# Patient Record
Sex: FEMALE | Race: WHITE | NOT HISPANIC OR LATINO | Employment: UNEMPLOYED | URBAN - METROPOLITAN AREA
[De-identification: names, ages, dates, MRNs, and addresses within clinical notes are randomized per-mention and may not be internally consistent; named-entity substitution may affect disease eponyms.]

---

## 2018-09-06 ENCOUNTER — HOSPITAL ENCOUNTER (EMERGENCY)
Facility: HOSPITAL | Age: 38
Discharge: HOME | End: 2018-09-06
Attending: SURGERY

## 2018-09-06 VITALS
OXYGEN SATURATION: 100 % | HEART RATE: 89 BPM | TEMPERATURE: 97 F | RESPIRATION RATE: 18 BRPM | DIASTOLIC BLOOD PRESSURE: 69 MMHG | WEIGHT: 134.81 LBS | SYSTOLIC BLOOD PRESSURE: 113 MMHG

## 2018-09-06 DIAGNOSIS — M54.50 LOW BACK PAIN: ICD-10-CM

## 2018-09-06 DIAGNOSIS — L02.01 ABSCESS OF FACE: Primary | ICD-10-CM

## 2018-09-06 LAB
BILIRUB UR QL STRIP: NEGATIVE
CLARITY UR: CLEAR
COLOR UR: YELLOW
GLUCOSE UR QL STRIP: NEGATIVE
HGB UR QL STRIP: ABNORMAL
KETONES UR QL STRIP: NEGATIVE
LEUKOCYTE ESTERASE UR QL STRIP: NEGATIVE
NITRITE UR QL STRIP: NEGATIVE
PH UR STRIP: 6 [PH] (ref 5–8)
PROT UR QL STRIP: NEGATIVE
SP GR UR STRIP: >=1.03 (ref 1–1.03)
URN SPEC COLLECT METH UR: ABNORMAL
UROBILINOGEN UR STRIP-ACNC: NEGATIVE EU/DL

## 2018-09-06 PROCEDURE — 96372 THER/PROPH/DIAG INJ SC/IM: CPT

## 2018-09-06 PROCEDURE — 81003 URINALYSIS AUTO W/O SCOPE: CPT

## 2018-09-06 PROCEDURE — 63600175 PHARM REV CODE 636 W HCPCS: Performed by: NURSE PRACTITIONER

## 2018-09-06 PROCEDURE — 99284 EMERGENCY DEPT VISIT MOD MDM: CPT | Mod: 25

## 2018-09-06 PROCEDURE — 25000003 PHARM REV CODE 250: Performed by: NURSE PRACTITIONER

## 2018-09-06 RX ORDER — CYCLOBENZAPRINE HCL 10 MG
10 TABLET ORAL EVERY 8 HOURS PRN
Qty: 12 TABLET | Refills: 0 | OUTPATIENT
Start: 2018-09-06 | End: 2020-06-20

## 2018-09-06 RX ORDER — TRAMADOL HYDROCHLORIDE 50 MG/1
50 TABLET ORAL EVERY 6 HOURS PRN
Qty: 12 TABLET | Refills: 0 | Status: SHIPPED | OUTPATIENT
Start: 2018-09-06 | End: 2018-09-11

## 2018-09-06 RX ORDER — MUPIROCIN 20 MG/G
OINTMENT TOPICAL ONCE
Status: COMPLETED | OUTPATIENT
Start: 2018-09-06 | End: 2018-09-06

## 2018-09-06 RX ORDER — SULFAMETHOXAZOLE AND TRIMETHOPRIM 800; 160 MG/1; MG/1
1 TABLET ORAL
Status: COMPLETED | OUTPATIENT
Start: 2018-09-06 | End: 2018-09-06

## 2018-09-06 RX ORDER — SULFAMETHOXAZOLE AND TRIMETHOPRIM 800; 160 MG/1; MG/1
1 TABLET ORAL EVERY 12 HOURS
Qty: 14 TABLET | Refills: 0 | Status: SHIPPED | OUTPATIENT
Start: 2018-09-06 | End: 2018-09-13

## 2018-09-06 RX ORDER — MUPIROCIN 20 MG/G
OINTMENT TOPICAL 3 TIMES DAILY
Qty: 15 G | Refills: 0 | OUTPATIENT
Start: 2018-09-06 | End: 2020-06-20

## 2018-09-06 RX ORDER — ORPHENADRINE CITRATE 30 MG/ML
60 INJECTION INTRAMUSCULAR; INTRAVENOUS
Status: COMPLETED | OUTPATIENT
Start: 2018-09-06 | End: 2018-09-06

## 2018-09-06 RX ORDER — KETOROLAC TROMETHAMINE 30 MG/ML
60 INJECTION, SOLUTION INTRAMUSCULAR; INTRAVENOUS
Status: COMPLETED | OUTPATIENT
Start: 2018-09-06 | End: 2018-09-06

## 2018-09-06 RX ADMIN — ORPHENADRINE CITRATE 60 MG: 30 INJECTION INTRAMUSCULAR; INTRAVENOUS at 01:09

## 2018-09-06 RX ADMIN — KETOROLAC TROMETHAMINE 60 MG: 30 INJECTION, SOLUTION INTRAMUSCULAR at 01:09

## 2018-09-06 RX ADMIN — MUPIROCIN: 20 OINTMENT TOPICAL at 01:09

## 2018-09-06 RX ADMIN — SULFAMETHOXAZOLE AND TRIMETHOPRIM 1 TABLET: 800; 160 TABLET ORAL at 01:09

## 2018-09-06 NOTE — ED PROVIDER NOTES
Encounter Date: 9/6/2018       History     Chief Complaint   Patient presents with    Back Pain     The history is provided by the patient.   Back Pain    The current episode started two days ago. The problem occurs constantly. The pain is associated with no known injury. The pain is present in the lumbar spine (left sided). The quality of the pain is described as shooting and stabbing. The pain does not radiate. The pain is at a severity of 8/10. The symptoms are aggravated by bending, twisting and certain positions. Pertinent negatives include no chest pain, no fever, no headaches, no abdominal pain, no bowel incontinence, no bladder incontinence, no dysuria and no weakness. She has tried nothing for the symptoms.   Abscess    This is a new problem. The current episode started two days ago. The problem has been gradually worsening. Affected Location: chin. The pain is at a severity of 8/10. The abscess is characterized by redness, painfulness, draining and swelling. Pertinent negatives include no fever, no diarrhea, no vomiting, no congestion, no rhinorrhea, no sore throat and no cough.     Review of patient's allergies indicates:  No Known Allergies  History reviewed. No pertinent past medical history.  Past Surgical History:   Procedure Laterality Date    TUBAL LIGATION       History reviewed. No pertinent family history.  Social History     Tobacco Use    Smoking status: Not on file   Substance Use Topics    Alcohol use: Not on file    Drug use: Not on file     Review of Systems   Constitutional: Negative for chills, fatigue and fever.   HENT: Negative for congestion, dental problem, ear pain, rhinorrhea, sore throat and trouble swallowing.    Eyes: Negative for pain, discharge, redness and visual disturbance.   Respiratory: Negative for cough, shortness of breath and wheezing.    Cardiovascular: Negative for chest pain, palpitations and leg swelling.   Gastrointestinal: Negative for abdominal pain, bowel  incontinence, constipation, diarrhea, nausea and vomiting.   Genitourinary: Negative for bladder incontinence, difficulty urinating, dysuria, flank pain, frequency, hematuria and urgency.   Musculoskeletal: Positive for back pain. Negative for arthralgias, myalgias and neck pain.   Skin: Positive for wound. Negative for pallor and rash.   Neurological: Negative for seizures, weakness and headaches.   Psychiatric/Behavioral: Negative.        Physical Exam     Initial Vitals [09/06/18 1334]   BP Pulse Resp Temp SpO2   132/75 87 20 97.3 °F (36.3 °C) 100 %      MAP       --         Physical Exam    Nursing note and vitals reviewed.  Constitutional: No distress.   HENT:   Head: Normocephalic and atraumatic.   Right Ear: External ear normal.   Left Ear: External ear normal.   Nose: Nose normal.   Mouth/Throat: Oropharynx is clear and moist.   Eyes: Conjunctivae, EOM and lids are normal. Pupils are equal, round, and reactive to light.   Neck: Neck supple.   Cardiovascular: Normal rate, regular rhythm, S1 normal, S2 normal, normal heart sounds and intact distal pulses.   Pulmonary/Chest: Effort normal and breath sounds normal. No respiratory distress.   Abdominal: Soft. Bowel sounds are normal. There is no tenderness. There is no CVA tenderness.   Musculoskeletal: Normal range of motion.        Lumbar back: She exhibits tenderness ( left paraspinal). She exhibits normal range of motion ( patient remains with full range of motion, but reports pain with certain positions /movement), no swelling, no edema, no deformity, no laceration and normal pulse.   Neurological: She is alert and oriented to person, place, and time. She has normal strength. GCS eye subscore is 4. GCS verbal subscore is 5. GCS motor subscore is 6.   Skin: Skin is warm and dry. Capillary refill takes less than 2 seconds. No rash noted. There is erythema ( withInduration, tenderness with palpation and mild drainage).   Psychiatric: She has a normal mood and  affect. Her speech is normal and behavior is normal.         ED Course   Procedures  Labs Reviewed   URINALYSIS, REFLEX TO URINE CULTURE - Abnormal; Notable for the following components:       Result Value    Specific Gravity, UA >=1.030 (*)     Occult Blood UA Trace (*)     All other components within normal limits    Narrative:     Preferred Collection Type->Urine, Clean Catch          Imaging Results          CT Renal Stone Study ABD Pelvis WO (Final result)  Result time 09/06/18 14:43:53    Final result by Sunita Carlson MD (09/06/18 14:43:53)                 Impression:      No nephrolithiasis, ureterolithiasis, hydronephrosis or hydroureter.      Electronically signed by: Sunita Carlson MD  Date:    09/06/2018  Time:    14:43             Narrative:    EXAMINATION:  CT RENAL STONE STUDY ABD PELVIS WO    CLINICAL HISTORY:  Hematuria;left side low back pain;    TECHNIQUE:  Low dose axial images, sagittal and coronal reformations were obtained from the lung bases to the pubic symphysis.  Contrast was not administered.    COMPARISON:  None    FINDINGS:  The lung bases are clear.  Base heart appears normal.  Calcified atheromatous disease affects the aorta and its branch vessels.    No radiopaque gallstones are seen.  No intrahepatic or extrahepatic biliary ductal dilatation is identified.  The unenhanced liver, spleen, pancreas and adrenal glands appear normal.  Both kidneys are normal in size, shape and contour.  No nephrolithiasis, ureterolithiasis, hydronephrosis or hydroureter is seen.  The urinary bladder is not well distended and cannot be adequately evaluated.  The uterus and adnexa appear normal.    Constipation.  No free air, free fluid or obstruction is detected.  No pathologically enlarged abdominal or pelvic lymph nodes are seen.    The skeletal structures show age-appropriate degenerative change.                               X-Ray Lumbar Spine Ap And Lateral (Final result)  Result time 09/06/18  14:05:38    Final result by Sunita Carlson MD (09/06/18 14:05:38)                 Impression:      As above.      Electronically signed by: Sunita Carlson MD  Date:    09/06/2018  Time:    14:05             Narrative:    EXAMINATION:  XR LUMBAR SPINE AP AND LATERAL    CLINICAL HISTORY:  low back pain;Low back pain    TECHNIQUE:  AP, lateral and spot images were performed of the lumbar spine.    COMPARISON:  None    FINDINGS:  Vertebral body alignment and heights are maintained.  There is disc space narrowing with endplate sclerosis at the L5-S1 level.  No fracture or subluxation is seen.                                     Medications   ketorolac injection 60 mg (60 mg Intramuscular Given 9/6/18 1348)   orphenadrine injection 60 mg (60 mg Intramuscular Given 9/6/18 1348)   sulfamethoxazole-trimethoprim 800-160mg per tablet 1 tablet (1 tablet Oral Given 9/6/18 1349)   mupirocin 2 % ointment ( Topical (Top) Given 9/6/18 1348)           1430  Patient update:  Patient reports improvement  In pain to her chin.  Reports that her back pain is still present.              Clinical Impression:   The primary encounter diagnosis was Abscess of face. A diagnosis of Low back pain was also pertinent to this visit.      Educated patient to take all antibiotics and apply warm compresses to chin to help with drainage.    Disposition:   Disposition: Discharged  Condition: Stable    The patient acknowledges that close follow up with medical provider is required. Instructed to follow up with PCP within 2 days. Patient was given specific return precautions. The patient agrees to comply with all instruction and directions given in the ER.        New Prescriptions    CYCLOBENZAPRINE (FLEXERIL) 10 MG TABLET    Take 1 tablet (10 mg total) by mouth every 8 (eight) hours as needed for Muscle spasms.    MUPIROCIN (BACTROBAN) 2 % OINTMENT    Apply topically 3 (three) times daily.    SULFAMETHOXAZOLE-TRIMETHOPRIM 800-160MG (BACTRIM DS) 800-160  MG TAB    Take 1 tablet by mouth every 12 (twelve) hours. for 7 days    TRAMADOL (ULTRAM) 50 MG TABLET    Take 1 tablet (50 mg total) by mouth every 6 (six) hours as needed for Pain.        was reviewed.                   Rhonda Ortiz NP  09/06/18 [id number]

## 2018-09-06 NOTE — ED TRIAGE NOTES
38 y.o. female presents to ER   Chief Complaint   Patient presents with    Back Pain   Pt reports lower back pain for two days and abscess to chin. No acute distress noted.

## 2018-09-06 NOTE — DISCHARGE INSTRUCTIONS
Wash area twice a day with antibacterial soap and water. Apply antibiotic ointment three times a day. Keep area clean. Take all antibiotics. Monitor for signs of infection such as redness, swelling, purulent discharge, or fever.     No not drive, drink alcohol, or operate machinery while taking this prescribed medication.    **Follow up with PCP in 24-48 hours. Return to ER with worsening of symptoms.     **Drink plenty fluids. Get plenty rest. Wash hands frequently.     **Our goal in the emergency department is to always give you outstanding care and exceptional service. You may receive a survey by mail or e-mail in the next week regarding your experience in our ED. We would greatly appreciate your completing and returning the survey. Your feedback provides us with a way to recognize our staff who give very good care and it helps us learn how to improve when your experience was below our aspiration of excellence.

## 2019-03-15 ENCOUNTER — HOSPITAL ENCOUNTER (EMERGENCY)
Age: 39
Discharge: HOME | End: 2019-03-15
Attending: EMERGENCY MEDICINE
Payer: COMMERCIAL

## 2019-03-15 VITALS
HEART RATE: 80 BPM | WEIGHT: 293 LBS | DIASTOLIC BLOOD PRESSURE: 86 MMHG | OXYGEN SATURATION: 99 % | BODY MASS INDEX: 51.91 KG/M2 | TEMPERATURE: 98.3 F | SYSTOLIC BLOOD PRESSURE: 159 MMHG | RESPIRATION RATE: 18 BRPM | HEIGHT: 63 IN

## 2019-03-15 DIAGNOSIS — R53.83 FATIGUE, UNSPECIFIED TYPE: Primary | ICD-10-CM

## 2019-03-15 DIAGNOSIS — D64.9 CHRONIC ANEMIA: ICD-10-CM

## 2019-03-15 DIAGNOSIS — L30.9 ECZEMA, UNSPECIFIED TYPE: ICD-10-CM

## 2019-03-15 LAB
ANION GAP SERPL CALC-SCNC: 9 MMOL/L (ref 3–18)
BASOPHILS # BLD: 0 K/UL (ref 0–0.1)
BASOPHILS NFR BLD: 1 % (ref 0–2)
BUN SERPL-MCNC: 8 MG/DL (ref 7–18)
BUN/CREAT SERPL: 13 (ref 12–20)
CALCIUM SERPL-MCNC: 8.4 MG/DL (ref 8.5–10.1)
CHLORIDE SERPL-SCNC: 102 MMOL/L (ref 100–108)
CO2 SERPL-SCNC: 27 MMOL/L (ref 21–32)
CREAT SERPL-MCNC: 0.62 MG/DL (ref 0.6–1.3)
DIFFERENTIAL METHOD BLD: ABNORMAL
EOSINOPHIL # BLD: 0.3 K/UL (ref 0–0.4)
EOSINOPHIL NFR BLD: 5 % (ref 0–5)
ERYTHROCYTE [DISTWIDTH] IN BLOOD BY AUTOMATED COUNT: 14.2 % (ref 11.6–14.5)
GLUCOSE SERPL-MCNC: 122 MG/DL (ref 74–99)
HCG SERPL QL: NEGATIVE
HCT VFR BLD AUTO: 32.1 % (ref 35–45)
HGB BLD-MCNC: 9.5 G/DL (ref 12–16)
LYMPHOCYTES # BLD: 2.2 K/UL (ref 0.9–3.6)
LYMPHOCYTES NFR BLD: 37 % (ref 21–52)
MCH RBC QN AUTO: 27.8 PG (ref 24–34)
MCHC RBC AUTO-ENTMCNC: 29.6 G/DL (ref 31–37)
MCV RBC AUTO: 93.9 FL (ref 74–97)
MONOCYTES # BLD: 0.3 K/UL (ref 0.05–1.2)
MONOCYTES NFR BLD: 5 % (ref 3–10)
NEUTS SEG # BLD: 3.1 K/UL (ref 1.8–8)
NEUTS SEG NFR BLD: 52 % (ref 40–73)
PLATELET # BLD AUTO: 400 K/UL (ref 135–420)
PMV BLD AUTO: 10.5 FL (ref 9.2–11.8)
POTASSIUM SERPL-SCNC: 4.4 MMOL/L (ref 3.5–5.5)
RBC # BLD AUTO: 3.42 M/UL (ref 4.2–5.3)
SODIUM SERPL-SCNC: 138 MMOL/L (ref 136–145)
T4 FREE SERPL-MCNC: 0.9 NG/DL (ref 0.7–1.5)
TSH SERPL DL<=0.05 MIU/L-ACNC: 4.05 UIU/ML (ref 0.36–3.74)
WBC # BLD AUTO: 5.9 K/UL (ref 4.6–13.2)

## 2019-03-15 PROCEDURE — 80048 BASIC METABOLIC PNL TOTAL CA: CPT

## 2019-03-15 PROCEDURE — 99283 EMERGENCY DEPT VISIT LOW MDM: CPT

## 2019-03-15 PROCEDURE — 84703 CHORIONIC GONADOTROPIN ASSAY: CPT

## 2019-03-15 PROCEDURE — 84439 ASSAY OF FREE THYROXINE: CPT

## 2019-03-15 PROCEDURE — 85025 COMPLETE CBC W/AUTO DIFF WBC: CPT

## 2019-03-15 PROCEDURE — 84443 ASSAY THYROID STIM HORMONE: CPT

## 2019-03-15 RX ORDER — ASCORBIC ACID 250 MG
250 TABLET ORAL 2 TIMES DAILY
Qty: 120 TAB | Refills: 0 | Status: SHIPPED | OUTPATIENT
Start: 2019-03-15 | End: 2019-05-14

## 2019-03-15 RX ORDER — TRIAMCINOLONE ACETONIDE 1 MG/G
OINTMENT TOPICAL 2 TIMES DAILY
Qty: 30 G | Refills: 0 | Status: SHIPPED | OUTPATIENT
Start: 2019-03-15 | End: 2021-12-06

## 2019-03-15 RX ORDER — FERROUS FUMARATE 324(106)MG
324 TABLET ORAL 2 TIMES DAILY
Qty: 60 TAB | Refills: 0 | Status: SHIPPED | OUTPATIENT
Start: 2019-03-15 | End: 2019-04-14

## 2019-03-15 NOTE — ED PROVIDER NOTES
Surekha Hussein is a 43 y.o. Female coming in with fatigue. Patient states that she has been fatigued for a couple weeks, but it has been worse over the last week. Denies cough, cold, congestion, fevers, dysuria. Denies sexual activity or vaginal discharge. Last menstrual period ended a week ago and lasted about 7 days which is normal. Denies dizziness, lightheadedness, CP or SOB. Also reports chronic exacerabtion of her eczema. No fevers, chills or weeping. No other complaints at this time. Past Medical History:   Diagnosis Date    Back pain     Chronic lumbosacral spine strain/sprain    Bilateral knee pain     Hypertension     Mid sternal chest pain     Pain of costosternal area    Morbid obesity ([facility] [address]        History reviewed. No pertinent surgical history. History reviewed. No pertinent family history. Social History     Socioeconomic History    Marital status: SINGLE     Spouse name: Not on file    Number of children: Not on file    Years of education: Not on file    Highest education level: Not on file   Social Needs    Financial resource strain: Not on file    Food insecurity - worry: Not on file    Food insecurity - inability: Not on file    Transportation needs - medical: Not on file   [employer] needs - non-medical: Not on file   Occupational History    Not on file   Tobacco Use    Smoking status: Never Smoker   Substance and Sexual Activity    Alcohol use: No    Drug use: No    Sexual activity: Not on file   Other Topics Concern    Not on file   Social History Narrative    Not on file         ALLERGIES: Penicillins and Sulfate salt    Review of Systems   Constitutional: Positive for fatigue. Negative for chills and fever. HENT: Negative. Negative for congestion. Eyes: Negative. Negative for visual disturbance. Respiratory: Negative. Negative for cough and shortness of breath. Cardiovascular: Negative. Negative for chest pain and leg swelling. Gastrointestinal: Negative. Negative for abdominal pain and vomiting. Genitourinary: Negative. Negative for difficulty urinating, dysuria and vaginal discharge. Musculoskeletal: Negative. Negative for back pain and myalgias. Skin: Positive for rash. Negative for wound. Neurological: Negative. Negative for dizziness, weakness and light-headedness. Psychiatric/Behavioral: Negative. Negative for suicidal ideas. All other systems reviewed and are negative. Vitals:    03/15/19 1030 03/15/19 1157   BP: 159/86    Pulse: 80    Resp: 18    Temp: 98.3 °F (36.8 °C)    SpO2: 99% 99%   Weight: 152.9 kg (337 lb)    Height: 5' 3\" (1.6 m)             Physical Exam   Constitutional: She is oriented to person, place, and time. She appears well-developed and well-nourished. No distress. Morbidly obsese. HENT:   Head: Normocephalic and atraumatic. Mouth/Throat: Oropharynx is clear and moist.   Eyes: Conjunctivae and EOM are normal. Pupils are equal, round, and reactive to light. No scleral icterus. Neck: Trachea normal and normal range of motion. Neck supple. No JVD present. No thyromegaly present. Cardiovascular: Normal rate, regular rhythm, S1 normal and S2 normal. Exam reveals no gallop and no friction rub. No murmur heard. Pulmonary/Chest: Effort normal and breath sounds normal. No accessory muscle usage. No respiratory distress. Abdominal: Soft. Normal appearance. She exhibits no distension. There is no tenderness. There is no rigidity, no rebound and no guarding. Musculoskeletal: Normal range of motion. She exhibits no edema or tenderness. Neurological: She is alert and oriented to person, place, and time. She has normal strength. No cranial nerve deficit or sensory deficit. Coordination normal.   Skin: Skin is warm and intact. No rash noted. Chronic area of patchy erythema and flaking at flexor surfaces. Psychiatric: She has a normal mood and affect.  Her speech is normal and behavior is normal.   Vitals reviewed. MDM  Number of Diagnoses or Management Options  Chronic anemia:   Eczema, unspecified type: Fatigue, unspecified type:   Diagnosis management comments: Nina Romero is a 43 y.o. Female coming in with fatigue. Normal vitals, well appearing. No evidence of infectious process. Will get basic labs, hbg, and TSH. T4 WNL, hbg at baseline. Will start on iron and vit C and refer for outpatient PCP follow up. Also requesting topical steroid for chronic eczema, no evidence of bacterial infection. Procedures    Vitals:  Patient Vitals for the past 12 hrs:   Temp Pulse Resp BP SpO2   03/15/19 1157 -- -- -- -- 99 %   03/15/19 1030 98.3 °F (36.8 °C) 80 18 159/86 99 %       Medications ordered:   Medications - No data to display      Lab findings:  Recent Results (from the past 12 hour(s))   CBC WITH AUTOMATED DIFF    Collection Time: 03/15/19 11:20 AM   Result Value Ref Range    WBC 5.9 4.6 - 13.2 K/uL    RBC 3.42 (L) 4.20 - 5.30 M/uL    HGB 9.5 (L) 12.0 - 16.0 g/dL    HCT 32.1 (L) 35.0 - 45.0 %    MCV 93.9 74.0 - 97.0 FL    MCH 27.8 24.0 - 34.0 PG    MCHC 29.6 (L) 31.0 - 37.0 g/dL    RDW 14.2 11.6 - 14.5 %    PLATELET 881 541 - 363 K/uL    MPV 10.5 9.2 - 11.8 FL    NEUTROPHILS 52 40 - 73 %    LYMPHOCYTES 37 21 - 52 %    MONOCYTES 5 3 - 10 %    EOSINOPHILS 5 0 - 5 %    BASOPHILS 1 0 - 2 %    ABS. NEUTROPHILS 3.1 1.8 - 8.0 K/UL    ABS. LYMPHOCYTES 2.2 0.9 - 3.6 K/UL    ABS. MONOCYTES 0.3 0.05 - 1.2 K/UL    ABS. EOSINOPHILS 0.3 0.0 - 0.4 K/UL    ABS.  BASOPHILS 0.0 0.0 - 0.1 K/UL    DF AUTOMATED     METABOLIC PANEL, BASIC    Collection Time: 03/15/19 11:20 AM   Result Value Ref Range    Sodium 138 136 - 145 mmol/L    Potassium 4.4 3.5 - 5.5 mmol/L    Chloride 102 100 - 108 mmol/L    CO2 27 21 - 32 mmol/L    Anion gap 9 3.0 - 18 mmol/L    Glucose 122 (H) 74 - 99 mg/dL    BUN 8 7.0 - 18 MG/DL    Creatinine 0.62 0.6 - 1.3 MG/DL    BUN/Creatinine ratio 13 12 - 20      GFR est AA >60 >60 ml/min/1.73m2    GFR est non-AA >60 >60 ml/min/1.73m2    Calcium 8.4 (L) 8.5 - 10.1 MG/DL   TSH 3RD GENERATION    Collection Time: 03/15/19 11:20 AM   Result Value Ref Range    TSH 4.05 (H) 0.36 - 3.74 uIU/mL   HCG QL SERUM    Collection Time: 03/15/19 11:20 AM   Result Value Ref Range    HCG, Ql. NEGATIVE  NEG     T4, FREE    Collection Time: 03/15/19 11:20 AM   Result Value Ref Range    T4, Free 0.9 0.7 - 1.5 NG/DL       Reevaluation of patient:   I have reassessed the patient. I discussed all results with the patient as well as possible causes of her symptoms. She was counseled on the importance of follow-up and  consult was made. Verbalizes understanding and agrees with discharge and follow-up plan. Disposition:  Diagnosis:   1. Fatigue, unspecified type    2. Chronic anemia    3. Eczema, unspecified type        Disposition: Discharge    Follow-up Information     Follow up With Specialties Details Why [address] [place] [address]  Schedule an appointment as soon as possible for a visit for office follow up Uriel Thibodeaux [address]  [address]    [address] [facility] EMERGENCY DEPT Emergency Medicine  As needed, If symptoms worsen [address] [facility]  [phone]              Medication List      START taking these medications    ascorbic acid (vitamin C) 250 mg tablet  Commonly known as:  VITAMIN C  Take 1 Tab by mouth two (2) times a day for 60 days. ferrous fumarate 324 mg (106 mg iron) Tab  Take 324 mg by mouth two (2) times a day for 30 days. triamcinolone acetonide 0.1 % ointment  Commonly known as:  KENALOG  Apply  to affected area two (2) times a day.  use thin layer           Where to Get Your Medications      Information about where to get these medications is not yet available    Ask your nurse or doctor about these medications  · ascorbic acid (vitamin C) 250 mg tablet  · ferrous fumarate 324 mg (106 mg iron) Tab  · triamcinolone acetonide 0.1 % ointment

## 2020-06-20 ENCOUNTER — HOSPITAL ENCOUNTER (EMERGENCY)
Facility: HOSPITAL | Age: 40
Discharge: HOME | End: 2020-06-20
Attending: SURGERY

## 2020-06-20 VITALS
SYSTOLIC BLOOD PRESSURE: 106 MMHG | OXYGEN SATURATION: 100 % | RESPIRATION RATE: 18 BRPM | WEIGHT: 155.19 LBS | TEMPERATURE: 97 F | HEART RATE: 70 BPM | DIASTOLIC BLOOD PRESSURE: 58 MMHG

## 2020-06-20 DIAGNOSIS — F43.9 STRESS: Primary | ICD-10-CM

## 2020-06-20 DIAGNOSIS — M25.473 REDNESS AND SWELLING OF ANKLE: ICD-10-CM

## 2020-06-20 DIAGNOSIS — M25.571 ACUTE BILATERAL ANKLE PAIN: ICD-10-CM

## 2020-06-20 DIAGNOSIS — M25.572 ACUTE BILATERAL ANKLE PAIN: ICD-10-CM

## 2020-06-20 DIAGNOSIS — R23.8 REDNESS AND SWELLING OF ANKLE: ICD-10-CM

## 2020-06-20 LAB
ALBUMIN SERPL BCP-MCNC: 3.5 G/DL (ref 3.5–5.2)
ALP SERPL-CCNC: 63 U/L (ref 55–135)
ALT SERPL W/O P-5'-P-CCNC: 32 U/L (ref 10–44)
ANION GAP SERPL CALC-SCNC: 12 MMOL/L (ref 8–16)
AST SERPL-CCNC: 19 U/L (ref 10–40)
BASOPHILS # BLD AUTO: 0.03 K/UL (ref 0–0.2)
BASOPHILS NFR BLD: 0.3 % (ref 0–1.9)
BILIRUB SERPL-MCNC: 0.3 MG/DL (ref 0.1–1)
BUN SERPL-MCNC: 10 MG/DL (ref 6–20)
CALCIUM SERPL-MCNC: 9 MG/DL (ref 8.7–10.5)
CHLORIDE SERPL-SCNC: 105 MMOL/L (ref 95–110)
CO2 SERPL-SCNC: 24 MMOL/L (ref 23–29)
CREAT SERPL-MCNC: 0.7 MG/DL (ref 0.5–1.4)
CRP SERPL-MCNC: 29.1 MG/L (ref 0–8.2)
DIFFERENTIAL METHOD: ABNORMAL
EOSINOPHIL # BLD AUTO: 0.1 K/UL (ref 0–0.5)
EOSINOPHIL NFR BLD: 1.6 % (ref 0–8)
ERYTHROCYTE [DISTWIDTH] IN BLOOD BY AUTOMATED COUNT: 12.9 % (ref 11.5–14.5)
ERYTHROCYTE [SEDIMENTATION RATE] IN BLOOD BY WESTERGREN METHOD: 39 MM/HR (ref 0–20)
EST. GFR  (AFRICAN AMERICAN): >60 ML/MIN/1.73 M^2
EST. GFR  (NON AFRICAN AMERICAN): >60 ML/MIN/1.73 M^2
GLUCOSE SERPL-MCNC: 93 MG/DL (ref 70–110)
HCT VFR BLD AUTO: 34.5 % (ref 37–48.5)
HGB BLD-MCNC: 11.3 G/DL (ref 12–16)
IMM GRANULOCYTES # BLD AUTO: 0.01 K/UL (ref 0–0.04)
IMM GRANULOCYTES NFR BLD AUTO: 0.1 % (ref 0–0.5)
LYMPHOCYTES # BLD AUTO: 2.2 K/UL (ref 1–4.8)
LYMPHOCYTES NFR BLD: 25.1 % (ref 18–48)
MCH RBC QN AUTO: 30.5 PG (ref 27–31)
MCHC RBC AUTO-ENTMCNC: 32.8 G/DL (ref 32–36)
MCV RBC AUTO: 93 FL (ref 82–98)
MONOCYTES # BLD AUTO: 1 K/UL (ref 0.3–1)
MONOCYTES NFR BLD: 10.9 % (ref 4–15)
NEUTROPHILS # BLD AUTO: 5.5 K/UL (ref 1.8–7.7)
NEUTROPHILS NFR BLD: 62 % (ref 38–73)
NRBC BLD-RTO: 0 /100 WBC
PLATELET # BLD AUTO: 289 K/UL (ref 150–350)
PMV BLD AUTO: 9.5 FL (ref 9.2–12.9)
POTASSIUM SERPL-SCNC: 3.8 MMOL/L (ref 3.5–5.1)
PROT SERPL-MCNC: 7.3 G/DL (ref 6–8.4)
RBC # BLD AUTO: 3.71 M/UL (ref 4–5.4)
RHEUMATOID FACT SERPL-ACNC: 13 IU/ML (ref 0–15)
SODIUM SERPL-SCNC: 141 MMOL/L (ref 136–145)
URATE SERPL-MCNC: 3.6 MG/DL (ref 2.4–5.7)
WBC # BLD AUTO: 8.92 K/UL (ref 3.9–12.7)

## 2020-06-20 PROCEDURE — 85651 RBC SED RATE NONAUTOMATED: CPT

## 2020-06-20 PROCEDURE — 36415 COLL VENOUS BLD VENIPUNCTURE: CPT

## 2020-06-20 PROCEDURE — 99284 EMERGENCY DEPT VISIT MOD MDM: CPT | Mod: 25

## 2020-06-20 PROCEDURE — 86140 C-REACTIVE PROTEIN: CPT

## 2020-06-20 PROCEDURE — 25000003 PHARM REV CODE 250: Performed by: SURGERY

## 2020-06-20 PROCEDURE — 80053 COMPREHEN METABOLIC PANEL: CPT

## 2020-06-20 PROCEDURE — 86038 ANTINUCLEAR ANTIBODIES: CPT

## 2020-06-20 PROCEDURE — 86431 RHEUMATOID FACTOR QUANT: CPT

## 2020-06-20 PROCEDURE — 84550 ASSAY OF BLOOD/URIC ACID: CPT

## 2020-06-20 PROCEDURE — 63600175 PHARM REV CODE 636 W HCPCS: Performed by: SURGERY

## 2020-06-20 PROCEDURE — 85025 COMPLETE CBC W/AUTO DIFF WBC: CPT

## 2020-06-20 RX ORDER — CYCLOBENZAPRINE HCL 10 MG
10 TABLET ORAL 3 TIMES DAILY PRN
Qty: 10 TABLET | Refills: 0 | Status: SHIPPED | OUTPATIENT
Start: 2020-06-20 | End: 2020-06-25

## 2020-06-20 RX ORDER — CLINDAMYCIN HYDROCHLORIDE 300 MG/1
300 CAPSULE ORAL 4 TIMES DAILY
Qty: 28 CAPSULE | Refills: 0 | Status: SHIPPED | OUTPATIENT
Start: 2020-06-20 | End: 2020-06-27

## 2020-06-20 RX ORDER — CLINDAMYCIN PHOSPHATE 150 MG/ML
600 INJECTION, SOLUTION INTRAVENOUS
Status: DISCONTINUED | OUTPATIENT
Start: 2020-06-20 | End: 2020-06-20 | Stop reason: HOSPADM

## 2020-06-20 RX ORDER — KETOROLAC TROMETHAMINE 10 MG/1
10 TABLET, FILM COATED ORAL EVERY 6 HOURS PRN
Qty: 15 TABLET | Refills: 0 | Status: SHIPPED | OUTPATIENT
Start: 2020-06-20 | End: 2021-02-01

## 2020-06-20 RX ORDER — KETOROLAC TROMETHAMINE 30 MG/ML
60 INJECTION, SOLUTION INTRAMUSCULAR; INTRAVENOUS
Status: DISCONTINUED | OUTPATIENT
Start: 2020-06-20 | End: 2020-06-20 | Stop reason: HOSPADM

## 2020-06-20 RX ORDER — MUPIROCIN 20 MG/G
OINTMENT TOPICAL 3 TIMES DAILY
Qty: 15 G | Refills: 0 | Status: SHIPPED | OUTPATIENT
Start: 2020-06-20 | End: 2020-06-30

## 2020-06-20 NOTE — ED PROVIDER NOTES
[facility]                                                 Chief Complaint  40 y.o. female with Joint Swelling (left ankle edema)      History of Present Illness  Pau Chaves presents to the emergency room with bilateral ankle pain  Patient states she has redness to the lateral aspects of both ankles now  Patient states this issues been going on for last 2 days, denies any trauma  Patient's history significant for significant left ankle surgery twenty years ago  Patient has no ORIF the left ankle from [facility] in the 1990s  Patient on exam has minimal erythema to the lateral bilateral ankles now  No insect bites, no signs of abscess drainage or cellulitis or fever today   Patient states this issues very stressful, denies any previous ankle redness  Patient states she has not been wearing new issues, no insect bites noted  Patient does suffer from significant anxiety, trying to keep it under control    The history is provided by the patient   device was not used during this ER visit  History reviewed. No pertinent past medical history.   Surgeries: Tubal ligation and ankle surgery  No known allergies    I have reviewed all of this patient's past medical, surgical, family, and social   histories as well as active allergies and medications documented in the  electronic medical record    Review of Systems and Physical Exam      Review of Systems  -- Constitution - no fever, denies fatigue, no weakness, no chills  -- Eyes - no tearing or redness, no visual disturbance  -- Ear, Nose - no tinnitus or earache, no nasal congestion or discharge  -- Mouth,Throat - no sore throat, no toothache, normal voice, normal swallowing  -- Respiratory - denies cough and congestion, no shortness of breath, no LNYCH  -- Cardiovascular - denies chest pain, no palpitations, denies claudication  -- Gastrointestinal - denies abdominal pain, nausea, vomiting, or diarrhea  -- Genitourinary -  no dysuria, denies flank pain, no hematuria, no STD risk  -- Musculoskeletal - bilateral ankle pain  -- Neurological - no headache, denies weakness or seizure; no LOC  -- Skin - denies pallor, rash, or changes in skin. no hives or welts noted    Vital Signs  Her temperature is 96.7 °F (35.9 °C).   Her blood pressure is 117/59 and her pulse is 77.   Her respiration is 20.     Physical Exam  -- Nursing note and vitals reviewed  -- Constitutional: Appears well-developed and well-nourished  -- Head: Atraumatic. Normocephalic. No obvious abnormality  -- Eyes: Pupils are equal and reactive to light. Normal conjunctiva and lids  -- Cardiac: Normal rate, regular rhythm and normal heart sounds  -- Pulmonary: Normal respiratory effort, breath sounds clear to auscultation  -- Abdominal: Soft, no tenderness. Normal bowel sounds. Normal liver edge  -- Musculoskeletal: Normal range of motion, no effusions. Joints stable   -- Neurological: No focal deficits. Showed good interaction with staff  -- Vascular: Posterior tibial, dorsalis pedis and radial pulses 2+ bilaterally    -- Lymphatics: No cervical or peripheral lymphadenopathy. No edema noted  -- Skin: mild erythema to the bilateral lateral aspects of the ankles    Emergency Room Course      Treatment and Evaluation  -- The electrolytes drawn in the ER today were within normal limits  -- The CBC drawn in the ER today was within normal limits  -- uric acid, rheumatoid factor, ARIS, ESR, CRP are pending    Left ankle x-ray  1. Remote ORIF of the distal tibia with postsurgical change of the distal fibula from previous fused osteotomy.   2. Soft tissue swelling without acute fracture.   3. Small calcaneal spur.     Medications Given  ketorolac injection 60 mg (has no administration in time range)   clindamycin injection 600 mg (has no administration in time range)     ED Physician Management  -- Diagnosis management comments: 40 y.o. female with ankle pain  -- patient with  significant ankle pain, lateral aspects have minor redness  -- patient denies any trauma fall, has no previous joint disease HX  -- patient did have left ankle surgery 20 years ago, x-ray stable today  -- CBC and CMP do not indicated infection, no fever noted now  -- have sent off additional lab work including uric acid/ARIS/ESR/CRP  -- these labs will be sent off, patient to follow-up results with her local MD  -- patient would like to start on antibiotics, no obvious cellulitis or insect bite  -- will start clindamycin and local wound care with Bactroban on discharge  -- patient encouraged to follow up with Orthopedics and PCP in her area  -- she is leaving locally to go back home today, does not see MDs routinely  -- strong recommendation for ortho/PCP follow-up in 48 hours in her town  -- antibiotics as a precaution, anti-inflammatories and muscle relaxer Rx  -- return to ER with any concerning symptoms after discharge today    Diagnosis  [M25.571, M25.572] Acute bilateral ankle pain (Primary)  [M25.473, R23.8] Redness and swelling of ankle    Disposition and Plan  -- Disposition: home  -- Condition: stable  -- Follow-up: Patient to follow up with ortho MD/PCP in the next 48 hours  -- I advised the patient that we have found no life threatening condition today  -- At this time, I believe the patient is clinically stable for discharge.   -- The patient acknowledges that close follow up with a MD is required   -- Patient agrees to comply with all instruction and direction given in the ER    This note is dictated on M*Modal word recognition program.  There are word recognition mistakes that are occasionally missed on review.             Andrez Clay MD  06/20/20 0800

## 2020-06-20 NOTE — ED TRIAGE NOTES
Stated she developed left lower ankle redness/ edema and warm to touch overnight. Had severe trauma many years ago but denies any recent trauma.  No neuro deficits.

## 2020-06-20 NOTE — ED NOTES
Patient upset at time of discharge, feeling like she is not getting the answers to her questions.  MD notified.

## 2020-06-22 LAB — ANA SER QL IF: NORMAL

## 2021-11-26 ENCOUNTER — HOSPITAL ENCOUNTER (INPATIENT)
Age: 41
LOS: 9 days | Discharge: HOME | DRG: 751 | End: 2021-12-06
Attending: EMERGENCY MEDICINE | Admitting: PSYCHIATRY & NEUROLOGY
Payer: COMMERCIAL

## 2021-11-26 DIAGNOSIS — M79.2 NEUROPATHIC PAIN: Primary | ICD-10-CM

## 2021-11-26 DIAGNOSIS — R45.851 SUICIDAL IDEATION: ICD-10-CM

## 2021-11-26 PROCEDURE — 80307 DRUG TEST PRSMV CHEM ANLYZR: CPT

## 2021-11-26 PROCEDURE — 81025 URINE PREGNANCY TEST: CPT

## 2021-11-26 PROCEDURE — 99284 EMERGENCY DEPT VISIT MOD MDM: CPT

## 2021-11-26 RX ORDER — LISINOPRIL 20 MG/1
20 TABLET ORAL DAILY
Status: DISCONTINUED | OUTPATIENT
Start: 2021-11-27 | End: 2021-11-27

## 2021-11-27 PROBLEM — R45.851 DEPRESSION WITH SUICIDAL IDEATION: Status: ACTIVE | Noted: 2021-11-27

## 2021-11-27 PROBLEM — F32.A DEPRESSION WITH SUICIDAL IDEATION: Status: ACTIVE | Noted: 2021-11-27

## 2021-11-27 LAB
ALBUMIN SERPL-MCNC: 3.4 G/DL (ref 3.4–5)
ALBUMIN/GLOB SERPL: 0.8 {RATIO} (ref 0.8–1.7)
ALP SERPL-CCNC: 89 U/L (ref 45–117)
ALT SERPL-CCNC: 72 U/L (ref 13–56)
AMPHET UR QL SCN: NEGATIVE
ANION GAP SERPL CALC-SCNC: 7 MMOL/L (ref 3–18)
APPEARANCE UR: CLEAR
AST SERPL-CCNC: 56 U/L (ref 10–38)
BACTERIA URNS QL MICRO: ABNORMAL /HPF
BARBITURATES UR QL SCN: NEGATIVE
BASOPHILS # BLD: 0 K/UL (ref 0–0.1)
BASOPHILS NFR BLD: 0 % (ref 0–2)
BENZODIAZ UR QL: NEGATIVE
BILIRUB SERPL-MCNC: 0.3 MG/DL (ref 0.2–1)
BILIRUB UR QL: NEGATIVE
BUN SERPL-MCNC: 15 MG/DL (ref 7–18)
BUN/CREAT SERPL: 16 (ref 12–20)
CALCIUM SERPL-MCNC: 9.5 MG/DL (ref 8.5–10.1)
CANNABINOIDS UR QL SCN: NEGATIVE
CHLORIDE SERPL-SCNC: 106 MMOL/L (ref 100–111)
CO2 SERPL-SCNC: 26 MMOL/L (ref 21–32)
COCAINE UR QL SCN: NEGATIVE
COLOR UR: YELLOW
COVID-19 RAPID TEST, COVR: NOT DETECTED
CREAT SERPL-MCNC: 0.94 MG/DL (ref 0.6–1.3)
DIFFERENTIAL METHOD BLD: ABNORMAL
EOSINOPHIL # BLD: 0 K/UL (ref 0–0.4)
EOSINOPHIL NFR BLD: 0 % (ref 0–5)
EPITH CASTS URNS QL MICRO: ABNORMAL /LPF (ref 0–5)
ERYTHROCYTE [DISTWIDTH] IN BLOOD BY AUTOMATED COUNT: 13.3 % (ref 11.6–14.5)
ETHANOL SERPL-MCNC: 8 MG/DL (ref 0–3)
GLOBULIN SER CALC-MCNC: 4.1 G/DL (ref 2–4)
GLUCOSE SERPL-MCNC: 124 MG/DL (ref 74–99)
GLUCOSE UR STRIP.AUTO-MCNC: NEGATIVE MG/DL
HCG UR QL: NEGATIVE
HCT VFR BLD AUTO: 36.6 % (ref 35–45)
HDSCOM,HDSCOM: ABNORMAL
HGB BLD-MCNC: 11.3 G/DL (ref 12–16)
HGB UR QL STRIP: ABNORMAL
IMM GRANULOCYTES # BLD AUTO: 0 K/UL (ref 0–0.04)
IMM GRANULOCYTES NFR BLD AUTO: 0 % (ref 0–0.5)
KETONES UR QL STRIP.AUTO: ABNORMAL MG/DL
LEUKOCYTE ESTERASE UR QL STRIP.AUTO: NEGATIVE
LYMPHOCYTES # BLD: 2.2 K/UL (ref 0.9–3.6)
LYMPHOCYTES NFR BLD: 23 % (ref 21–52)
MCH RBC QN AUTO: 29 PG (ref 24–34)
MCHC RBC AUTO-ENTMCNC: 30.9 G/DL (ref 31–37)
MCV RBC AUTO: 94.1 FL (ref 78–100)
METHADONE UR QL: NEGATIVE
MONOCYTES # BLD: 0.4 K/UL (ref 0.05–1.2)
MONOCYTES NFR BLD: 4 % (ref 3–10)
NEUTS SEG # BLD: 6.9 K/UL (ref 1.8–8)
NEUTS SEG NFR BLD: 72 % (ref 40–73)
NITRITE UR QL STRIP.AUTO: NEGATIVE
NRBC # BLD: 0 K/UL (ref 0–0.01)
NRBC BLD-RTO: 0 PER 100 WBC
OPIATES UR QL: POSITIVE
PCP UR QL: NEGATIVE
PH UR STRIP: 5 [PH] (ref 5–8)
PLATELET # BLD AUTO: 336 K/UL (ref 135–420)
PMV BLD AUTO: 9.6 FL (ref 9.2–11.8)
POTASSIUM SERPL-SCNC: 4.2 MMOL/L (ref 3.5–5.5)
PROT SERPL-MCNC: 7.5 G/DL (ref 6.4–8.2)
PROT UR STRIP-MCNC: NEGATIVE MG/DL
RBC # BLD AUTO: 3.89 M/UL (ref 4.2–5.3)
RBC #/AREA URNS HPF: NEGATIVE /HPF (ref 0–5)
SODIUM SERPL-SCNC: 139 MMOL/L (ref 136–145)
SOURCE, COVRS: NORMAL
SP GR UR REFRACTOMETRY: 1.03 (ref 1–1.03)
UROBILINOGEN UR QL STRIP.AUTO: 0.2 EU/DL (ref 0.2–1)
WBC # BLD AUTO: 9.6 K/UL (ref 4.6–13.2)
WBC URNS QL MICRO: ABNORMAL /HPF (ref 0–4)

## 2021-11-27 PROCEDURE — 74011250637 HC RX REV CODE- 250/637: Performed by: EMERGENCY MEDICINE

## 2021-11-27 PROCEDURE — 93005 ELECTROCARDIOGRAM TRACING: CPT

## 2021-11-27 PROCEDURE — 87635 SARS-COV-2 COVID-19 AMP PRB: CPT

## 2021-11-27 PROCEDURE — 82077 ASSAY SPEC XCP UR&BREATH IA: CPT

## 2021-11-27 PROCEDURE — 85025 COMPLETE CBC W/AUTO DIFF WBC: CPT

## 2021-11-27 PROCEDURE — 80053 COMPREHEN METABOLIC PANEL: CPT

## 2021-11-27 PROCEDURE — 74011250637 HC RX REV CODE- 250/637: Performed by: STUDENT IN AN ORGANIZED HEALTH CARE EDUCATION/TRAINING PROGRAM

## 2021-11-27 PROCEDURE — 81001 URINALYSIS AUTO W/SCOPE: CPT

## 2021-11-27 PROCEDURE — 65220000003 HC RM SEMIPRIVATE PSYCH

## 2021-11-27 PROCEDURE — 74011250637 HC RX REV CODE- 250/637: Performed by: PSYCHIATRY & NEUROLOGY

## 2021-11-27 RX ORDER — ESCITALOPRAM OXALATE 5 MG/1
5 TABLET ORAL DAILY
COMMUNITY
End: 2021-12-06

## 2021-11-27 RX ORDER — LISINOPRIL 20 MG/1
20 TABLET ORAL DAILY
Status: DISCONTINUED | OUTPATIENT
Start: 2021-11-28 | End: 2021-12-06 | Stop reason: HOSPADM

## 2021-11-27 RX ORDER — TRAZODONE HYDROCHLORIDE 50 MG/1
50 TABLET ORAL
Status: DISCONTINUED | OUTPATIENT
Start: 2021-11-27 | End: 2021-12-04

## 2021-11-27 RX ORDER — HYDROXYZINE 25 MG/1
25 TABLET, FILM COATED ORAL
COMMUNITY
End: 2021-12-06

## 2021-11-27 RX ORDER — IBUPROFEN 400 MG/1
400 TABLET ORAL
Status: DISCONTINUED | OUTPATIENT
Start: 2021-11-27 | End: 2021-12-06 | Stop reason: HOSPADM

## 2021-11-27 RX ORDER — HYDROXYZINE PAMOATE 50 MG/1
50 CAPSULE ORAL
Status: DISCONTINUED | OUTPATIENT
Start: 2021-11-27 | End: 2021-12-06 | Stop reason: HOSPADM

## 2021-11-27 RX ORDER — GABAPENTIN 300 MG/1
300 CAPSULE ORAL 4 TIMES DAILY
COMMUNITY
End: 2021-12-06

## 2021-11-27 RX ORDER — CYCLOBENZAPRINE HCL 10 MG
TABLET ORAL
COMMUNITY
End: 2021-12-06

## 2021-11-27 RX ORDER — LISINOPRIL 20 MG/1
20 TABLET ORAL DAILY
Status: DISCONTINUED | OUTPATIENT
Start: 2021-11-27 | End: 2021-11-27 | Stop reason: SDUPTHER

## 2021-11-27 RX ORDER — HALOPERIDOL 5 MG/ML
5 INJECTION INTRAMUSCULAR
Status: DISCONTINUED | OUTPATIENT
Start: 2021-11-27 | End: 2021-11-28

## 2021-11-27 RX ORDER — LORAZEPAM 2 MG/ML
1 INJECTION INTRAMUSCULAR
Status: DISCONTINUED | OUTPATIENT
Start: 2021-11-27 | End: 2021-12-06 | Stop reason: HOSPADM

## 2021-11-27 RX ORDER — IBUPROFEN 600 MG/1
600 TABLET ORAL ONCE
Status: COMPLETED | OUTPATIENT
Start: 2021-11-27 | End: 2021-11-27

## 2021-11-27 RX ORDER — HALOPERIDOL 5 MG/1
5 TABLET ORAL
Status: DISCONTINUED | OUTPATIENT
Start: 2021-11-27 | End: 2021-11-28

## 2021-11-27 RX ORDER — BENZTROPINE MESYLATE 1 MG/1
1 TABLET ORAL
Status: DISCONTINUED | OUTPATIENT
Start: 2021-11-27 | End: 2021-11-28

## 2021-11-27 RX ORDER — CLONIDINE HYDROCHLORIDE 0.1 MG/1
0.1 TABLET ORAL
Status: COMPLETED | OUTPATIENT
Start: 2021-11-27 | End: 2021-11-27

## 2021-11-27 RX ORDER — BENZTROPINE MESYLATE 1 MG/ML
1 INJECTION INTRAMUSCULAR; INTRAVENOUS
Status: DISCONTINUED | OUTPATIENT
Start: 2021-11-27 | End: 2021-11-28

## 2021-11-27 RX ADMIN — IBUPROFEN 600 MG: 600 TABLET ORAL at 07:41

## 2021-11-27 RX ADMIN — CLONIDINE HYDROCHLORIDE 0.1 MG: 0.1 TABLET ORAL at 13:27

## 2021-11-27 RX ADMIN — IBUPROFEN 400 MG: 400 TABLET ORAL at 20:43

## 2021-11-27 RX ADMIN — LISINOPRIL 20 MG: 20 TABLET ORAL at 07:48

## 2021-11-27 NOTE — PROGRESS NOTES
Problem: Falls - Risk of  Goal: *Absence of Falls  Description: Document Idania Alcantar Fall Risk and appropriate interventions in the flowsheet.     Patient has been free from falls    Outcome: Progressing Towards Goal  Note: Fall Risk Interventions:     Patient endorses Si and depression  Patient was calm ans cooperative with admission process but became agitated when she couldn't get some personal items on her time frame                             Problem: Suicide  Goal: *STG: Remains safe in hospital  Outcome: Progressing Towards Goal    Patient has been prescribed medication yet  Goal: *STG/LTG: Complies with medication therapy  Outcome: Progressing Towards Goal

## 2021-11-27 NOTE — ED PROVIDER NOTES
EMERGENCY DEPARTMENT HISTORY AND PHYSICAL EXAM    11:26 PM      Date: 11/26/2021  Patient Name: Steffanie Gallego    History of Presenting Illness     Chief Complaint   Patient presents with   [address] Problem         History Provided By: Patient    Additional History (Context): Steffanie Gallego is a 42 y.o. female with PMHx significant for morbid obesity, depression, chronic knee pain, HTN who presents with complaints of suicidal ideation with a plan in place to OD and drown in the [facility] these ideas have been fleeting but more intense over the last 3-4 days. No HI or AV hallucinations/delusions. Recent stressors include unemployment x 1 year, financial issues and most recently homeless. Denies illicit drug use or heavy alcohol intake. PCP: Shantanu Trammell MD    Current Facility-Administered Medications   Medication Dose Route Frequency Provider Last Rate Last Admin    lisinopriL (PRINIVIL, ZESTRIL) tablet 20 mg  20 mg Oral DAILY AMIRA Miranda         Current Outpatient Medications   Medication Sig Dispense Refill    triamcinolone acetonide (KENALOG) 0.1 % ointment Apply  to affected area two (2) times a day. use thin layer 30 g 0       Past History     Past Medical History:  Past Medical History:   Diagnosis Date    Back pain     Chronic lumbosacral spine strain/sprain    Bilateral knee pain     Hypertension     Mid sternal chest pain     Pain of costosternal area    Morbid obesity ([address]        Past Surgical History:  No past surgical history on file. Family History:  No family history on file. Social History:  Social History     Tobacco Use    Smoking status: Never Smoker    Smokeless tobacco: Not on file   Substance Use Topics    Alcohol use: No    Drug use: No       Allergies:   Allergies   Allergen Reactions    Penicillins Unable to Obtain    Sulfate Salt Unknown (comments)     Causing severe  Headaches           Review of Systems       Review of Systems Constitutional: Negative. Negative for chills and fever. HENT: Negative. Negative for congestion, ear pain and rhinorrhea. Eyes: Negative. Negative for pain and redness. Respiratory: Negative. Negative for cough and shortness of breath. Cardiovascular: Negative. Negative for chest pain, palpitations and leg swelling. Gastrointestinal: Negative. Negative for abdominal pain, constipation, diarrhea, nausea and vomiting. Genitourinary: Negative. Negative for dysuria, frequency, hematuria and urgency. Musculoskeletal: Negative. Negative for back pain, gait problem, joint swelling and neck pain. Skin: Negative. Negative for rash and wound. Neurological: Negative. Negative for dizziness, seizures, speech difficulty, weakness, light-headedness and headaches. Hematological: Negative for adenopathy. Does not bruise/bleed easily. Psychiatric/Behavioral: Positive for dysphoric mood, sleep disturbance and suicidal ideas. All other systems reviewed and are negative. Physical Exam     Visit Vitals  BP (!) 194/99   Pulse (!) 113   Temp 97.1 °F (36.2 °C)   Resp 20   Ht 5' 3.6\" (1.615 m)   Wt (!) 175.5 kg (387 lb)   SpO2 98%   BMI 67.27 kg/m²         Physical Exam  Vitals and nursing note reviewed. Constitutional:       General: She is not in acute distress. Appearance: Normal appearance. She is obese. She is not ill-appearing, toxic-appearing or diaphoretic. HENT:      Head: Normocephalic and atraumatic. Nose: Nose normal.      Mouth/Throat:      Mouth: Mucous membranes are moist.      Pharynx: Oropharynx is clear. Eyes:      General: Lids are normal. Vision grossly intact. Conjunctiva/sclera: Conjunctivae normal.   Cardiovascular:      Rate and Rhythm: Normal rate and regular rhythm. Pulses: Normal pulses. Heart sounds: Normal heart sounds. Pulmonary:      Effort: Pulmonary effort is normal. No respiratory distress.       Breath sounds: Normal breath sounds. No stridor. No wheezing, rhonchi or rales. Chest:      Chest wall: No tenderness. Abdominal:      Palpations: Abdomen is soft. Tenderness: There is no abdominal tenderness. There is no right CVA tenderness, left CVA tenderness or guarding. Musculoskeletal:         General: Normal range of motion. Cervical back: Full passive range of motion without pain, normal range of motion and neck supple. No tenderness. Lymphadenopathy:      Cervical: No cervical adenopathy. Skin:     General: Skin is warm and dry. Capillary Refill: Capillary refill takes less than 2 seconds. Neurological:      General: No focal deficit present. Mental Status: She is alert and oriented to person, place, and time. Psychiatric:         Mood and Affect: Mood is depressed. Affect is tearful. Behavior: Behavior normal. Behavior is cooperative. Thought Content: Thought content includes suicidal ideation. Thought content does not include homicidal ideation. Thought content includes suicidal plan. Thought content does not include homicidal plan. Diagnostic Study Results     Labs -  Recent Results (from the past 12 hour(s))   CBC WITH AUTOMATED DIFF    Collection Time: 11/27/21 12:45 AM   Result Value Ref Range    WBC 9.6 4.6 - 13.2 K/uL    RBC 3.89 (L) 4.20 - 5.30 M/uL    HGB 11.3 (L) 12.0 - 16.0 g/dL    HCT 36.6 35.0 - 45.0 %    MCV 94.1 78.0 - 100.0 FL    MCH 29.0 24.0 - 34.0 PG    MCHC 30.9 (L) 31.0 - 37.0 g/dL    RDW 13.3 11.6 - 14.5 %    PLATELET 000 569 - 730 K/uL    MPV 9.6 9.2 - 11.8 FL    NRBC 0.0 0  WBC    ABSOLUTE NRBC 0.00 0.00 - 0.01 K/uL    NEUTROPHILS 72 40 - 73 %    LYMPHOCYTES 23 21 - 52 %    MONOCYTES 4 3 - 10 %    EOSINOPHILS 0 0 - 5 %    BASOPHILS 0 0 - 2 %    IMMATURE GRANULOCYTES 0 0.0 - 0.5 %    ABS. NEUTROPHILS 6.9 1.8 - 8.0 K/UL    ABS. LYMPHOCYTES 2.2 0.9 - 3.6 K/UL    ABS. MONOCYTES 0.4 0.05 - 1.2 K/UL    ABS. EOSINOPHILS 0.0 0.0 - 0.4 K/UL    ABS. BASOPHILS 0.0 0.0 - 0.1 K/UL    ABS. IMM. GRANS. 0.0 0.00 - 0.04 K/UL    DF AUTOMATED     METABOLIC PANEL, COMPREHENSIVE    Collection Time: 11/27/21 12:45 AM   Result Value Ref Range    Sodium 139 136 - 145 mmol/L    Potassium 4.2 3.5 - 5.5 mmol/L    Chloride 106 100 - 111 mmol/L    CO2 26 21 - 32 mmol/L    Anion gap 7 3.0 - 18 mmol/L    Glucose 124 (H) 74 - 99 mg/dL    BUN 15 7.0 - 18 MG/DL    Creatinine 0.94 0.6 - 1.3 MG/DL    BUN/Creatinine ratio 16 12 - 20      GFR est AA >60 >60 ml/min/1.73m2    GFR est non-AA >60 >60 ml/min/1.73m2    Calcium 9.5 8.5 - 10.1 MG/DL    Bilirubin, total 0.3 0.2 - 1.0 MG/DL    ALT (SGPT) 72 (H) 13 - 56 U/L    AST (SGOT) 56 (H) 10 - 38 U/L    Alk. phosphatase 89 45 - 117 U/L    Protein, total 7.5 6.4 - 8.2 g/dL    Albumin 3.4 3.4 - 5.0 g/dL    Globulin 4.1 (H) 2.0 - 4.0 g/dL    A-G Ratio 0.8 0.8 - 1.7     ETHYL ALCOHOL    Collection Time: 11/27/21 12:45 AM   Result Value Ref Range    ALCOHOL(ETHYL),SERUM 8 (H) 0 - 3 MG/DL   URINALYSIS W/ RFLX MICROSCOPIC    Collection Time: 11/27/21 12:45 AM   Result Value Ref Range    Color YELLOW      Appearance CLEAR      Specific gravity 1.028 1.005 - 1.030      pH (UA) 5.0 5.0 - 8.0      Protein Negative NEG mg/dL    Glucose Negative NEG mg/dL    Ketone TRACE (A) NEG mg/dL    Bilirubin Negative NEG      Blood TRACE (A) NEG      Urobilinogen 0.2 0.2 - 1.0 EU/dL    Nitrites Negative NEG      Leukocyte Esterase Negative NEG         Radiologic Studies -   No orders to display         Medical Decision Making   I am the first provider for this patient. I reviewed available nursing notes, past medical history, past surgical history, family history and social history. Vital Signs-Reviewed the patient's vital signs. Records Reviewed: Nursing Notes and Old Medical Records (Time of Review: 11:26 PM)    Pulse Oximetry Analysis - 98% on RA- normal     ED Course: Progress Notes, Reevaluation, and Consults:  11:26 PM  Initial assessment performed. The patients presenting problems have been discussed, and they/their family are in agreement with the care plan formulated and outlined with them. I have encouraged them to ask questions as they arise throughout their visit. 1:37 AM patient's drug screen is positive for opiates and alcohol level is 8. She is not pregnant. Mild elevation in ALT and AST and CBC shows mild anemia with a hemoglobin of 11.3.  UA is normal.  She is medically cleared at this time. 2:01 AM : Pt care transferred to Dr. Meg Guzman  ,ED provider. History of patient complaint(s), available diagnostic reports and current treatment plan has been discussed thoroughly. Bedside rounding on patient occured : no . Intended disposition of patient : TBD  Pending diagnostics reports and/or labs (please list): Evaluation by crisis in a.m. Provider Notes (Medical Decision Making):     Patient is a 19-year-old female who presents to the ER tearful with suicidal ideations and a suicidal plan in place. She has been having fleeting ideas of suicide with more intense feelings over the last 3 to 4 days. She is recently homeless as her sister is no longer able to help her financially she is struggling because she has been unemployed for the last year. 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 She is on Lexapro, gabapentin, and Flexeril which she is taking as directed. No prior suicidal attempt. She does have history of hypertension was previously on lisinopril and her blood pressure is significantly elevated on arrival.  We will give her a dose of lisinopril here and obtain labs to medically clear. Diagnosis     Clinical Impression:   1. Suicidal ideation        Disposition: pending       Follow-up Information    None          Current Discharge Medication List            Dictation disclaimer:  Please note that this dictation was completed with [employer], the [employer] voice recognition software.   Quite often unanticipated grammatical, syntax, homophones, and other interpretive errors are inadvertently transcribed by the computer software. Please disregard these errors. Please excuse any errors that have escaped final proofreading.

## 2021-11-27 NOTE — BSMART NOTE
Comprehensive Assessment Form Part 1    Section I - Disposition    Discussed patient with on-call psychiatrist, patient is receptive to voluntary admission at [facility]; admission orders received, and report given to charge nurse. The on-call Psychiatrist consulted was Dr. Braulio Eisenmenger. The admitting Psychiatrist will be Dr. Adrienne Rodriguez. The admitting Diagnosis is Depression. Section II - Integrated Summary  Summary: Patient was seen for crisis evaluation per request of Dr. Adele Busch for suicidal ideation. She has a history of Depression, Hypertension, history of chronic back and knee pain. She reports she ambulates without difficulty however if distance is lengthy she will stop to rest with no assistance needed. She does all her ADL's with no assistance needed. She stated she is suicidal and needs help. She stated her plan is to go to the [facility] and drown herself \"so no one will find my body because it's a traumatic thing for someone to experience and I don't wan to traumatize anyone\". She stated she would drown her self because \"I can't swim so that will pretty much solve everything\". Denies previous history of inpatient or outpatient psychiatric treatment. She denies homicidal ideations. She denies auditory/visual hallucinations. UDS (+) Opiates, however patient stated she does not have a script and does not take on a daily basis \"I just got one from my friend\". She stated she lost her job and her sister has put her out of the hotel they were staying in together. She is unsure of where she will be living at this point. She has contracted for safety in facility however is unable to contract outside of facility \"If I leave the hospital no, I won't be safe\". No previous history of psychiatric inpatient or outpatient treatment. The patient is deemed competent to provide informed consent. The information is given by the patient. The Chief Complaint is Depression, suicidal ideation.    The Precipitant Factors are Loss of job, homelessness. Previous Hospitalizations:None,  Current Psychiatrist None,     Lethality Assessment:    The potential for suicide is noted by the following: noted by the following;  intent, defined plan, ideation and means. The potential for homicide is not noted. The patient has not been a perpetrator of sexual or physical abuse. There are not pending charges. The patient is felt to be at risk for self harm or harm to others. The attending nurse was advised the patient is at risk for self harm. She has contracted for safety in hospital.    Section III - Psychosocial  The patient's overall mood and attitude is calm and cooperative. Feelings of helplessness and hopelessness are observed by verbalization \"I'm homeless, I'm unemployed, that's pretty hopeless\". Generalized anxiety is not observed. Panic is not observed. Phobias are not observed. Obsessive compulsive tendencies are not observed. Section IV - Mental Status Exam  The patient's appearance clean and appropriate. The patient's behavior shows no evidence of impairment. The patient is oriented to time, place, person and situation. The patient's speech is slowed and is soft. The patient's mood  is depressed, is withdrawn and is sad. The range of affect is flat. The patient's thought content  demonstrates no evidence of impairment. The thought process shows no evidence of impairment. The patient's perception shows no evidence of impairment. The patient's memory shows no evidence of impairment. The patient's appetite shows no evidence of impairment. The patient's sleep shows no evidence of impairment. The patient's insight is blaming, \"my sister said she does not want to take care of me, but I took care of her for 3 yrs though\". The patient's judgement is impaired. Section V - Substance Abuse  The patient is not using substances. Section VI - Living Arrangements  The patient is single.   The patient is homeless since Monday when she was told by her sister that she had to leave the hotel. The patient has one child age 21. The patient does not plan to return home upon discharge due to her being homeless. The patient does not have legal issues pending. The patient's source of income comes from she has none. The patient's greatest support comes from \"no one\". The patient has not been in an event described as horrible or outside the realm of ordinary life experience either currently or in the past. The patient has not been a victim of sexual/physical abuse. Section VII - Other Areas of Clinical Concern  The highest grade achieved is 10th grade with the overall quality of school experience being described as \"I missed a lot of time\". The patient is currently  unemployed and speaks [place] as a primary language. The patient has no communication impairments affecting communication. The patient's preference for learning can be described as: can read and write adequately.   The patient's hearing is normal.  The patient's vision is normal .      Samantha Sutton, RN,MSN

## 2021-11-27 NOTE — ED NOTES
Pt transported into mental health unit. Took all of pt's personal belongings, pt wearing 2 hospital gowns.

## 2021-11-27 NOTE — ED NOTES
TRANSFER - ED to INPATIENT REPORT:    Verbal report given to Lisha Rojas RN(name) on Susan Sullivan  being transferred to [address] [place] [address] for routine progression of care       Report consisted of patients Situation, Background, Assessment and   Recommendations(SBAR). SBAR report made available to receiving floor on this patient being transferred to [address] [place] [address]  for routine progression of care       Admitting diagnosis Depression with suicidal ideation [F32. A, R45.851]    Information from the following report(s) ED Summary, Intake/Output and MAR was made available to receiving floor. Lines:       HCG Status for ALL Females under 53 y/o: YES     Medication list unable to confirm    Opportunity for questions and clarification was provided. Patient is oriented to time, place, person and situation   Patient is  continent and ambulatory without assist     Valuables transported with patient     Patient transported with:   Nurse and Security      =Monitored (most recent)  Vitals w/ MEWS Score (last day)     Date/Time MEWS Score Pulse Resp Temp BP Level of Consciousness SpO2    11/27/21 1448  85 20  185/97 Alert (0) 96 %    11/27/21 1204 1 79 20 98.6 °F (37 °C) 166/96 Alert (0) 97 %    11/27/21 0901  85   173/102      11/27/21 0735 1 88 20 98.5 °F (36.9 °C) 179/108 Alert (0) 96 %    11/26/21 2335 3 113 20 97.1 °F (36.2 °C) 194/99 Alert (0) 98 %          Septic Patients:     Lactic Acid  No results found for: LACPOC (Most recent on top)  Repeat drawn:  Time:      ALL LACTIC ACIDS GREATER THAN 2 MUST BE REPEATED POC WITHIN 4 HOURS OR PRIOR TO ADMISSION               Total Fluid Bolus initiated and documented on MAR:     All ordered antibiotics initiated within first 3 hours of TIME ZERO?

## 2021-11-27 NOTE — ED TRIAGE NOTES
Here voluntary to receive treatment. Suicidal ideation with plan. She states \"thought about for a while\" . Plan to overdose et drown herself. She does have a means of carrying out plan. Denies prior attempt, but has had thoughts of harming self in past. Denies taking any pills tonight, due to \"do not want to hurt my mother\".

## 2021-11-27 NOTE — ED NOTES
States she has wanted to end life for a \"while\" and has plan of overdosing with ibuprofen, lexapro, gabapentin and flexeril. She was [place] \"[place] my body- dont want to upset my mother\". She reports prior thoughts of suicide when she was 24. States she has had multiple thoughts, but \"does not want to her mother\". States she is under increase in stress- unemployed et no job for a year. Lost her unemployment 2 months ago.  Was living with sister Emma Pierce \"kicked out several days ago\"

## 2021-11-28 PROBLEM — E66.01 CLASS 3 SEVERE OBESITY IN ADULT (HCC): Status: ACTIVE | Noted: 2021-11-28

## 2021-11-28 PROBLEM — F33.2 MAJOR DEPRESSIVE DISORDER, RECURRENT EPISODE, SEVERE WITH ANXIOUS DISTRESS (HCC): Status: ACTIVE | Noted: 2021-11-28

## 2021-11-28 LAB
ATRIAL RATE: 98 BPM
CALCULATED P AXIS, ECG09: 39 DEGREES
CALCULATED R AXIS, ECG10: 24 DEGREES
CALCULATED T AXIS, ECG11: 56 DEGREES
DIAGNOSIS, 93000: NORMAL
P-R INTERVAL, ECG05: 146 MS
Q-T INTERVAL, ECG07: 328 MS
QRS DURATION, ECG06: 74 MS
QTC CALCULATION (BEZET), ECG08: 418 MS
VENTRICULAR RATE, ECG03: 98 BPM

## 2021-11-28 PROCEDURE — 74011250637 HC RX REV CODE- 250/637: Performed by: PSYCHIATRY & NEUROLOGY

## 2021-11-28 PROCEDURE — 99221 1ST HOSP IP/OBS SF/LOW 40: CPT | Performed by: PSYCHIATRY & NEUROLOGY

## 2021-11-28 PROCEDURE — 65220000003 HC RM SEMIPRIVATE PSYCH

## 2021-11-28 RX ORDER — CLONIDINE HYDROCHLORIDE 0.1 MG/1
0.1 TABLET ORAL
Status: DISCONTINUED | OUTPATIENT
Start: 2021-11-28 | End: 2021-12-06 | Stop reason: HOSPADM

## 2021-11-28 RX ORDER — CITALOPRAM 10 MG/1
10 TABLET ORAL DAILY
Status: DISCONTINUED | OUTPATIENT
Start: 2021-11-28 | End: 2021-11-30

## 2021-11-28 RX ADMIN — CLONIDINE HYDROCHLORIDE 0.1 MG: 0.1 TABLET ORAL at 12:04

## 2021-11-28 RX ADMIN — CITALOPRAM HYDROBROMIDE 10 MG: 10 TABLET ORAL at 12:04

## 2021-11-28 RX ADMIN — LISINOPRIL 20 MG: 20 TABLET ORAL at 10:10

## 2021-11-28 RX ADMIN — IBUPROFEN 400 MG: 400 TABLET ORAL at 14:22

## 2021-11-28 NOTE — BH NOTES
Pt with new complaints of menstrual cramping. MD made aware of pt's symptoms and new orders provided. Will continue to monitor and support as needed.

## 2021-11-28 NOTE — BH NOTES
Pt. is intrusive, manic and argumentative with staff. PRN haldol po given for agitation. Pt. remain LOS for safety.

## 2021-11-28 NOTE — BH NOTES
The writer has observed the patient's behavior throughout this shift ([id number]). During this shift patient spent the majority of the time isolated in room. Patient has spoken with the medical doctor but did not eat breakfast. In addition, patient has eaten lunch. Patient's affect is a little moderate but sometimes flat. Will continue to monitor the patient's safety and safety locations.

## 2021-11-28 NOTE — H&P
[address] [facility] HISTORY AND PHYSICAL    Name:  Bony Ellison  MR#:   [id number]  :  1980  ACCOUNT #:  [de-identified]  ADMIT DATE:  2021      Admission to the emergency room on 2021, admission to the behavioral program on 2021. IDENTIFYING INFORMATION:  The patient is a 23-year-old female, single, admitted to this facility on a voluntary basis on the above-mentioned date. BASIS FOR ADMISSION:  The patient presented herself to [facility] Emergency Department with a history of being increasingly depressed and suicidal with a plan of jumping into the [facility] and drown, \"since I do not know how to swim. \"  The patient described the depression, which is chronic with recurrences, becoming more intense during the last 3 or 4 days since the sister that she was staying with at a local motel basically told her to leave the premises. Helpless, hopeless, anhedonic, the patient described being overwhelmed by her situation and become increasingly suicidal.  It was then when she decided to consult with emergency room and when upon being medically cleared with the case being presented to the physician on-call, the patient was admitted to my service. PSYCHIATRIC HISTORY:  The patient described a chronic history of depression with recurrences. There was a time in which she used to see members of [facility] for her medical care. She was prescribed with Lexapro up to 20 mg a day. The patient described improvement with her depression by basically feeling \"numb,\" however, it appears that she was not able to relate her lack of emotions to the point, in which Lexapro was reduced to only 5 mg a day, which is not working. The patient mentioned that she has never been hospitalized before. 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 It appears that her last client brought to her service  of COVID-23.   The patient has been without the job, she says for a year. She was not able to get her job back, the reason for that is not clear, especially with the pandemic and many people requiring home health care. Regardless, she was staying with her sister in one of the local motels. She has always taken care of the sister, she says, and this time her sister told her that both of them needed to leave the premises. She began to pack and then she noted that her sister was not packing. She described her sister as never being direct with how she feels and she obviously learned that the only one leaving the motel was herself. During the initial evaluation, the patient described taking care of her sister all of her life basically. She mentioned that she is not able to go to any other family member. Her parents are  and her father has always been very distant and never with them when they needed him to be there, she says. In addition, her mother moved in with one of the patient's brothers, who is apparently diagnosed with paranoid schizophrenia. She said that she is able to stay there because the owner of the apartment, who lives up on the second floor of this residence, has a child herself also with same type of problem that her brother has. However, she is not able to stay with them. She is not able to stay either with the rest of the family. So it appears that being homeless is the main stressor. MEDICAL HISTORY:  The patient is morbidly obese. Her BMI is 67.27 kg per square meter. She is described as having problems with chronic back pain; chronic strain/sprain of the lumbosacral spine. Bilateral knee pain is a problem. She has had a prior history of hypertension, mild sternal chest pain, and again her obesity. SURGICAL HISTORY:  Negative. ALLERGIES:  SHE IS DESCRIBED TO BE ALLERGIC TO PENICILLINS, UNKNOWN REACTION, AND HAS HAD A HISTORY OF PROBLEMS WITH SULFATE SALTS, WHICH CAUSE SEVERE HEADACHES.     REVIEW OF SYSTEMS: Review of systems was basically described as negative when examined in the emergency room with exception of her psychiatric review, which was described that of a dysphoric patient with sleep disturbance and suicidal ideations with a plan. The rest of the systems reviewed were negative. PHYSICAL EXAMINATION:  Vital signs in the emergency room showed the patient with a blood pressure of 194/99, heart rate 113, temperature 36.2 degrees centigrade. Her weight is 387 pounds. BMI is 67.27 and oxygen saturation rate at room air is 98%. As previously described, has some problems with shortness of breath, possibly associated to her obesity. Otherwise, the physical exam in itself was negative with exception of her overall weight as indicated, the same as her psychiatric examination, which was that of a patient with a depressed mood, tearful with suicidal ideations and suicidal plan. LABORATORY DATA:  Multiple labs have been performed since the patient was admitted to the emergency room, including a CBC with differential that shows what appears to be a chronic anemia. Hemoglobin this time is 11.3 with RBC of 3.89. The rest of the CBC is basically normal.  Urinalysis is basically negative. Comprehensive metabolic panel showed sodium 139, potassium 4.2, chloride of 106. Blood sugar elevated to 124. BUN 15, creatinine 0.94, estimated GFR above 60 mL/minute. Liver function tests showed elevation of ALT to 72 and AST to 56 with normal alkaline phosphatase of 89. Alcohol levels were 8 mg/dL. Urine drug screen was positive for opiates. COVID rapid testing showed negative results from a nasopharyngeal sample. Electrocardiogram showed a sinus rhythm with a heart rate of 98 beats per minute,  and QTc 418. Nonspecific T-wave abnormalities described. ALCOHOL AND DRUG HISTORY:  The patient denies the use of alcohol.   However, she did have a beer prior to admission, she said, and also taking one \"pill\" from a friend of hers hoping to feel better and that is the reason for the opiate positive test results. Otherwise, she denies the use of alcohol regularly and also denies the use of opiates and/or any other drugs recently, abusing over-the-counter medications or prescription medications. PERSONAL HISTORY AND FAMILY HISTORY:  The patient's parents are alive, they are . Her father is remarried. The patient described having two brothers and two sisters. She described a history of mental health problems in the family as one of her brother suffers with paranoid schizophrenia. The rest of the family appeared to be okay. The patient's sister, with whom she was staying by the way, is someone that the patient has always taken care of, helping her economically for many years she says, with a place to stay; however, after a short time of being with her in this motel, she was asked to be out by her sister she knew \"was going to do something like this since she told me in the past when I was taking care of her, if she would have done the same for me, she had said no. \"    MENTAL STATUS EXAM:  This is a female who looks her stated age. During this evaluation, the patient was found to be coherent, showing quality of continuity of associations without evidence of flight of ideas, pressure of speech, ideas of reference or influence or any hallucinatory process. Sensorium is clear. There is no evidence of cognitive impairment. Verbal abilities are excellent. She described herself as helpless, hopeless, anhedonic, and also having suicidal thoughts with a plan as above stated. However, she is able, willing, and capable to talk to a staff if unable to control thoughts of self-harm. Insight and judgment are currently present and the patient is considered to have capacity. CLINICAL IMPRESSION:  AXIS I:  Major depressive disorder, recurrent with anxious distress without psychotic symptoms.   AXIS II: Noncontributory. AXIS III:  Hypertension by history; morbidly obese; history of chronic lumbosacral spine pain; bilateral knee pain; history of midsternal chest pain, remote; history of adverse effects to sulfate salts, which produce headaches; and penicillins, unknown reaction. TREATMENT PLAN:  1. The patient was admitted to the adult program, will be seen daily and will be referred to the groups within context of the program.  2.  She will have an inpatient  assigned to her care. 3.  The patient has been prescribed with Lexapro in the past.  Rather than utilizing the same antidepressant, we will proceed to prescribe for her citalopram, initial dose of 10 mg, to be increased to 20 very soon thereafter, especially if she is tolerating it. The patient will not require to have a dose reduction of her Lexapro. It will be, however, discontinued. For anxiety, hydroxyzine pamoate will be prescribed. 4.  Several lab tests have been requested including an iron profile, hemoglobin A1c, lipid panel, and a TSH. They will be drawn tomorrow. ESTIMATED LENGTH OF STAY AND PROGNOSIS:  ELOS is 5 days to 7 days. Prognosis will depend upon treatment response and treatment compliance. Ky Powers MD, LFAPA      [id number]  D:  11/28/2021 10:27  T:  11/28/2021 16:15  JOB #:  [id number]    Behavioral Services  Medicare Certification Upon Admission    I certify that this patient's inpatient psychiatric hospital admission is medically necessary for:      [x] Treatment which could reasonably be expected to improve this patient's condition,       [] For diagnostic study;     AND     [x] The inpatient psychiatric services are provided while the individual is under the care of a physician and are included in the individualized plan of care. Estimated length of stay/service is 5 days. Plan for post-hospital care will include both, psychiatric and medical follow up.      Electronically signed by [unfilled] on 11/29/2021 at 9:17 AM

## 2021-11-28 NOTE — H&P
Psychiatry History and Physical    Subjective:     Date of Evaluation:  11/28/2021    Reason for Referral:  Alix Merino was referred to the examiners from  ED for SI. History of Presenting Problem: Alix Merino is a 43 yo F with PMH HTN who was admitted for SI. Denies HI and hallucinations. Tox screen positive for opiates. EtOH positive. Rapid COVID negative. She reports some menstrual cramps but denies any other complaints today. Patient Active Problem List    Diagnosis Date Noted    Major depressive disorder, recurrent episode, severe with anxious distress ([facility] 11/28/2021    Class 3 severe obesity in adult [facility]) 11/28/2021    Back pain     Left knee pain      Past Medical History:   Diagnosis Date    Back pain     Chronic lumbosacral spine strain/sprain    Bilateral knee pain     Hypertension     Mid sternal chest pain     Pain of costosternal area    Morbid obesity ([facility]      No past surgical history on file. No family history on file. Social History     Tobacco Use    Smoking status: Never Smoker    Smokeless tobacco: Not on file   Substance Use Topics    Alcohol use: No     Prior to Admission medications    Medication Sig Start Date End Date Taking? Authorizing Provider   escitalopram oxalate (Lexapro) 5 mg tablet Take 5 mg by mouth daily. Yes Provider, Historical   gabapentin (NEURONTIN) 300 mg capsule Take 300 mg by mouth four (4) times daily. Yes Provider, Historical   hydrOXYzine HCL (ATARAX) 25 mg tablet Take 25 mg by mouth three (3) times daily as needed. Yes Provider, Historical   cyclobenzaprine (FLEXERIL) 10 mg tablet Take  by mouth three (3) times daily as needed for Muscle Spasm(s). Yes Provider, Historical   triamcinolone acetonide (KENALOG) 0.1 % ointment Apply  to affected area two (2) times a day.  use thin layer 3/15/19   Joanna Ramirez MD     Allergies   Allergen Reactions    Penicillins Unable to Obtain    Sulfate Salt Unknown (comments) Causing severe  Headaches          Review of Systems - History obtained from chart review and the patient  General ROS: negative  Psychological ROS: positive for - depression and suicidal ideation  Ophthalmic ROS: negative  ENT ROS: negative  Respiratory ROS: negative  Cardiovascular ROS: negative  Gastrointestinal ROS: negative  Musculoskeletal ROS: negative  Neurological ROS: negative  Dermatological ROS: negative      Objective:     Patient Vitals for the past 8 hrs:   BP Temp Pulse Resp SpO2   11/28/21 1345 (!) 163/93  98 18 97 %   11/28/21 1125 (!) 182/105  99 18 96 %   11/28/21 1010 (!) 184/104 98.1 °F (36.7 °C) 88 18 95 %       Mental Status exam: WNL except for    Sensorium  oriented to time, place and person   Orientation situation   Relations cooperative   Eye Contact appropriate   Appearance:  age appropriate   Motor Behavior:  within normal limits   Speech:  soft   Vocabulary average   Thought Process: goal directed   Thought Content free of delusions and free of hallucinations   Suicidal ideations plan and intention   Homicidal ideations no plan  and no intention   Mood:  sad   Affect:  flat   Memory recent  adequate   Memory remote:  adequate   Concentration:  adequate   Abstraction:  concrete   Insight:  fair   Reliability good   Judgment:  fair         Physical Exam:   Visit Vitals  BP (!) 163/93   Pulse 98   Temp 98.1 °F (36.7 °C)   Resp 18   Ht 5' 3.6\" (1.615 m)   Wt (!) 175.5 kg (387 lb)   SpO2 97%   Breastfeeding No   BMI 67.27 kg/m²     General appearance: fatigued, cooperative, no distress, appears stated age  Head: Normocephalic, without obvious abnormality, atraumatic  Eyes: negative  Ears: normal TM's and external ear canals AU  Nose: Nares normal. Septum midline. Mucosa normal. No drainage or sinus tenderness.   Throat: Lips, mucosa, and tongue normal. Teeth and gums normal  Neck: supple, symmetrical, trachea midline and no adenopathy  Lungs: clear to auscultation bilaterally  Heart: regular rate and rhythm, S1, S2 normal, no murmur, click, rub or gallop  Abdomen: soft, non-tender. Extremities: extremities normal, atraumatic, no cyanosis or edema  Skin: Skin color, texture, turgor normal. No lesions. eczema on arms and elbows  Neurologic: Grossly normal        Impression:      Principal Problem:    Major depressive disorder, recurrent episode, severe with anxious distress ([address] (11/28/2021)    Active Problems:    Class 3 severe obesity in adult [facility]) (11/28/2021)          Plan:     Recommendations for Treatment/Conditions:  Psychiatric treatment recommended while in hospital  Admit to inpatient psych for SI    Referral To:    Inpatient psychiatric care        [place], [place]   11/28/2021 2:05 PM

## 2021-11-29 LAB
CHOLEST SERPL-MCNC: 195 MG/DL
HBA1C MFR BLD: 6.1 % (ref 4.2–5.6)
HDLC SERPL-MCNC: 37 MG/DL (ref 40–60)
HDLC SERPL: 5.3 {RATIO} (ref 0–5)
IRON SATN MFR SERPL: 17 % (ref 20–50)
IRON SERPL-MCNC: 58 UG/DL (ref 50–175)
LDLC SERPL CALC-MCNC: 127 MG/DL (ref 0–100)
LIPID PROFILE,FLP: ABNORMAL
TIBC SERPL-MCNC: 335 UG/DL (ref 250–450)
TRIGL SERPL-MCNC: 155 MG/DL (ref ?–150)
TSH SERPL DL<=0.05 MIU/L-ACNC: 2.65 UIU/ML (ref 0.36–3.74)
VLDLC SERPL CALC-MCNC: 31 MG/DL

## 2021-11-29 PROCEDURE — 84443 ASSAY THYROID STIM HORMONE: CPT

## 2021-11-29 PROCEDURE — 74011250637 HC RX REV CODE- 250/637: Performed by: PSYCHIATRY & NEUROLOGY

## 2021-11-29 PROCEDURE — 99232 SBSQ HOSP IP/OBS MODERATE 35: CPT | Performed by: PSYCHIATRY & NEUROLOGY

## 2021-11-29 PROCEDURE — 80061 LIPID PANEL: CPT

## 2021-11-29 PROCEDURE — 65220000003 HC RM SEMIPRIVATE PSYCH

## 2021-11-29 PROCEDURE — 36415 COLL VENOUS BLD VENIPUNCTURE: CPT

## 2021-11-29 PROCEDURE — 83036 HEMOGLOBIN GLYCOSYLATED A1C: CPT

## 2021-11-29 PROCEDURE — 83540 ASSAY OF IRON: CPT

## 2021-11-29 RX ADMIN — LISINOPRIL 20 MG: 20 TABLET ORAL at 10:03

## 2021-11-29 RX ADMIN — CITALOPRAM HYDROBROMIDE 10 MG: 10 TABLET ORAL at 10:03

## 2021-11-29 NOTE — BSMART NOTE
BH Biopsychosocial Assessment    Current Level of Psychosocial Functioning     [x]Independent  []Dependent  []Minimal Assist      Comments:      Psychosocial High Risk Factors (check all that apply)      []Unable to obtain meds                                                               [x]Chronic illness/pain    [x]Substance abuse  ? [x]Lack of Family Support   [x]Financial stress   [x]Isolation   [x]Inadequate Community Resources  [x]Suicide ideation with plan to \"drown in river, can't swim\". []Not taking medications   []Victim of crime   []Developmental Delay  []Unable to manage personal needs    []Age 72 or older   [x]  Homeless  [][place] transportation   []Readmission within 30 days  [x]Unemployment  []Traumatic Event    Psychiatric Advanced Directive: n/a    Family to involve in treatment: family. . not now    Sexual Orientation:  Not discussed    Patient Strengths: asking for help    Patient Barriers: SI, overwhelmed, hopeless, homeless, unemployed, UDS + for opiates, no support system    CD education provided: in groups and IT    Safety plan: will contract for safety with nursing staff & tx team    CMHC/MH history: denies prior hospitalizations or any outpt services    Plan of Care:  medication management, group/individual therapies, family meetings, psycho -education, treatment team meetings to assist with stabilization    Initial Discharge Plan:  Refer to [facility] as [address] [facility] (SEE fyi )  EA support group    Clinical Summary:      Pt is a 69-year-old female, single, admitted from [facility] Emergency Department with a history of being increasingly depressed and suicidal with a plan of jumping into the [facility] and drown, \"since I do not know how to swim. \"  Pt reports she staying with sister at a local motel basically told her to leave the premises.   Helpless, hopeless, anhedonic, the patient described being overwhelmed by her situation and became increasingly suicidal. Pt currently unemployed past year, now homeless with almost no support system. Pain management issues. CLINICAL IMPRESSION:  AXIS I:  Major depressive disorder, recurrent with anxious distress without psychotic symptoms. AXIS II:  Noncontributory. AXIS III:  Hypertension by history; morbidly obese; history of chronic lumbosacral spine pain; bilateral knee pain; history of midsternal chest pain, remote; history of adverse effects to sulfate salts, which produce headaches; and penicillins, unknown reaction. Intervention: pt was explained the structure of [address] adult unit as well as basic expectations of tx team and staff. This included understanding of purpose / goal of group & activities tx to help pt establish some support, get relief & find some direction. Summary of some basic CBT principles explained. Also reminded she will learn to put energy into solutions to rebuild self esteem. Dr & tx team updated.

## 2021-11-29 NOTE — PROGRESS NOTES
[address] [facility] [address]  Inpatient Progress Note     Date of Service: 11/29/21  Hospital Day: 2     Subjective/Interval History   11/29/21    Treatment Team Notes:  Notes reviewed and/or discussed and report that Meg Camarena is a patient recently admitted to the facility, attention invited to the dictated admission note which is self-explanatory. Patient interview: Meg Camarena was interviewed by this writer today. During our session, the patient was noted to be rather despondent, still feeling helpless and hopeless, however unable to feel or to disclose anger towards her sister. It is not clear if this inability also worsens the patient's depression, however that is to be expected. During the session, the patient exercised rationalization and denial as her main ego defense mechanisms, the same as suppression. The case was also presented during the morning treatment team session: With an inpatient  being assigned to her care. Objective     Visit Vitals  BP (!) 163/93   Pulse 98   Temp 98.1 °F (36.7 °C)   Resp 18   Ht 5' 3.6\" (1.615 m)   Wt (!) 175.5 kg (387 lb)   SpO2 97%   Breastfeeding No   BMI 67.27 kg/m²     Still showing an elevated blood pressure.     Recent Results (from the past 24 hour(s))   TSH 3RD GENERATION    Collection Time: 11/29/21  7:19 AM   Result Value Ref Range    TSH 2.65 0.36 - 3.74 uIU/mL   LIPID PANEL    Collection Time: 11/29/21  7:19 AM   Result Value Ref Range    LIPID PROFILE          Cholesterol, total 195 <200 MG/DL    Triglyceride 155 (H) <150 MG/DL    HDL Cholesterol 37 (L) 40 - 60 MG/DL    LDL, calculated 127 (H) 0 - 100 MG/DL    VLDL, calculated 31 MG/DL    CHOL/HDL Ratio 5.3 (H) 0 - 5.0     HEMOGLOBIN A1C W/O EAG    Collection Time: 11/29/21  7:19 AM   Result Value Ref Range    Hemoglobin A1c 6.1 (H) 4.2 - 5.6 %   IRON PROFILE    Collection Time: 11/29/21  7:19 AM   Result Value Ref Range    Iron 58 50 - 175 ug/dL    TIBC 335 250 - 450 ug/dL    Iron % saturation 17 (L) 20 - 50 %     Above results noted. The patient should be considered to be prediabetic based upon the above-mentioned test results. Mental Status Examination     Appearance/Hygiene 42 y.o. BLACK/ female  Hygiene limited   Behavior/Social Relatedness  withdrawn   Musculoskeletal Gait/Station: appropriate  Tone (flaccid, cogwheeling, spastic): not assessed  Psychomotor (hyperkinetic, hypokinetic): calm   Involuntary movements (tics, dyskinesias, akathisa, stereotypies): none   Speech   Rate, rhythm, volume, fluency and articulation are appropriate   Mood   depressed   Affect    flat   Thought Process Linear and goal directed   Thought Content and Perceptual Disturbances  the patient continues to describe the presence of suicidal thoughts, however she is able to CFS while in the hospital.  Otherwise she denies auditory and visual hallucinations, ideas of reference or influence, or any delusional thoughts. Sensorium and Cognition  Grossly intact   Insight  Limited   Judgment  fair        Assessment/Plan      Psychiatric Diagnoses:   Patient Active Problem List   Diagnosis Code    Back pain M54.9    Left knee pain M25.562    Major depressive disorder, recurrent episode, severe with anxious distress ([facility]) F33.2    Class 3 severe obesity in adult [facility]) E66.01       Medical Diagnoses: Plus hypertension    Psychosocial and contextual factors: Same    Level of impairment/disability: Moderately disabled    1. The patient required prescriptions of clonidine yesterday due to her showing an elevated blood pressure. Treatment with lisinopril has been restarted with a 20 mg daily, and so we will give it a little longer. Time. However she may require combination of antihypertensive treatment. In addition treatment with citalopram was a started with initial positive tolerance noted. 2.  Reviewed instructions, risks, benefits and side effects of medications  3. Disposition/Discharge Date: To be determined. The patient was assigned an inpatient  with whom the case will be discussed.     Alysa Maza MD, [address] [place] [address]

## 2021-11-29 NOTE — BH NOTES
Patient was encouraged to switch rooms due to roommate loudly responding to internal stimuli. Patient declined despite this nurse again encouraging patient and asking patient Richardson Fishman don't you just come down and see the room, it's much more quiet and calm? \"  Patient again declined stating \"I don't like being around a lot of people.  I would rather just stay where I am.\"

## 2021-11-29 NOTE — BSMART NOTE
SOCIAL WORK GROUP THERAPY PROGRESS NOTE    Group Time:  10:30am    Group Topic:  Coping Skills    Group Participation:     Pt was unavailable for group due to being extremely depressed & wanting to be alone in room, away from others feeling better. This seems typical for her when dysphoric as she isolates & ruminates. Staff remained supportive.

## 2021-11-29 NOTE — GROUP NOTE
IP  GROUP DOCUMENTATION INDIVIDUAL                                                                          Group Therapy Note    Date: 11/28/2021    Group Start Time: 2000  Group End Time: 2030  Group Topic: Medication    [facility] 1 ADULT CHEM DEP    Melany Philip RN    IP [facility] GROUP DOCUMENTATION GROUP    Group Therapy Note    Attendees: 6         Attendance: Did not attend            Additional Notes:  Pt. refused to get up and join the group.     Axel Samuels RN

## 2021-11-29 NOTE — PROGRESS NOTES
Problem: Falls - Risk of  Goal: *Absence of Falls  Description: Document Earma Raina Fall Risk and appropriate interventions in the flowsheet. Outcome: Progressing Towards Goal  Note: Fall Risk Interventions:     Problem: Suicide  Goal: *STG: Remains safe in hospital  Description: AEB remaining safe and free from harm daily while hospitalized. Outcome: Progressing Towards Goal  Goal: *STG: Seeks staff when feelings of self harm or harm towards others arise  Description: AEB seeking staff when feelings of harm towards self/others arise, for each occurrence, daily while hospitalized. Outcome: Progressing Towards Goal  Goal: *STG: Attends activities and groups  Description: AEB attending at least 3-4 groups/activities daily while hospitalized.   Outcome: Not Progressing Towards Goal

## 2021-11-30 PROCEDURE — 65220000003 HC RM SEMIPRIVATE PSYCH

## 2021-11-30 PROCEDURE — 99232 SBSQ HOSP IP/OBS MODERATE 35: CPT | Performed by: PSYCHIATRY & NEUROLOGY

## 2021-11-30 PROCEDURE — 74011250637 HC RX REV CODE- 250/637: Performed by: PSYCHIATRY & NEUROLOGY

## 2021-11-30 RX ORDER — CITALOPRAM 20 MG/1
20 TABLET, FILM COATED ORAL DAILY
Status: DISCONTINUED | OUTPATIENT
Start: 2021-12-01 | End: 2021-12-06 | Stop reason: HOSPADM

## 2021-11-30 RX ORDER — TRIAMCINOLONE ACETONIDE 1 MG/G
OINTMENT TOPICAL 3 TIMES DAILY
Status: DISCONTINUED | OUTPATIENT
Start: 2021-11-30 | End: 2021-12-01

## 2021-11-30 RX ADMIN — LISINOPRIL 20 MG: 20 TABLET ORAL at 09:02

## 2021-11-30 RX ADMIN — CITALOPRAM HYDROBROMIDE 10 MG: 10 TABLET ORAL at 09:01

## 2021-11-30 RX ADMIN — IBUPROFEN 400 MG: 400 TABLET ORAL at 20:41

## 2021-11-30 RX ADMIN — TRIAMCINOLONE ACETONIDE: 1 OINTMENT TOPICAL at 21:00

## 2021-11-30 NOTE — BH NOTES
The patient has been in bed or up in her room all evening. She denies thoughts of harming herself or others. No incidents this evening. Continuing to monitor and will continue to provide support.

## 2021-11-30 NOTE — BSMART NOTE
ART THERAPY GROUP PROGRESS NOTE    PATIENT SCHEDULED FOR GROUP AT: 10:00    ATTENDANCE: Full    PARTICIPATION LEVEL:  Does not engage in the art process or gives up easily. ATTENTION LEVEL: Unable to attend to task at hand. FOCUS: Mindfulness     SYMBOLIC & THEMATIC CONTENT AS NOTED IN IMAGERY: She was calm and presented with a blunted affect. She lacked investment in the task at hand and stared out the window the majority of group. She was called out to meet with her SW for about 1/4 of group. She claimed that she enjoyed sitting and listening to the music provided as a way to slow down and relax.

## 2021-11-30 NOTE — BSMART NOTE
SOCIAL WORK GROUP THERAPY PROGRESS NOTE    Group Time:  10:15am to 10:55am    Group Topic:  Coping Skills    C D Issues    Group Participation:      Pt moderately involved during group discussion but remained attentive. Affect still flat but not as dysphoric. Read parts of handout. .  Discussion included the process of making \"Change\" by answering questions on handout with an emphasis on strengths & weaknesses to support improving one's self esteem. Feels needs to stop being so cynical & be more respectful to help manage her irritability. Sees strengths as good humor & usually lots of positive energy.

## 2021-11-30 NOTE — BSMART NOTE
SW Contact:      Pt is a 41-year-old female, single, admitted from [facility] Emergency Department with a history of being increasingly depressed and suicidal with a plan of jumping into the [facility] and drown, \"since I do not know how to swim. \"  Pt reports she staying with sister at a local motel basically told her to leave the premises.  Helpless, hopeless, anhedonic, the patient described being overwhelmed by her situation and became increasingly suicidal. Pt currently unemployed past year, now homeless with almost no support system. Pain management issues. Today: pt reports still overwhelmed & reality of sister having her move out without potential to return, just hitting pt.     CLINICAL IMPRESSION:  AXIS I:  Major depressive disorder, recurrent with anxious distress without psychotic symptoms. AXIS II:  Noncontributory. AXIS III:  Hypertension by history; morbidly obese; history of chronic lumbosacral spine pain; bilateral knee pain; history of midsternal chest pain, remote; history of adverse effects to sulfate salts, which produce headaches; and penicillins, unknown reaction. Intervention:  Processed now accepting she's homeless. Pt & writer reviewed recent phone call to sister, when pt reminded sib how difficult situation & challenge for medical issues. Sister responded with something like \"you'll work it [employer] and conversation mostly ended. Pt commented also on \"working my entire life, since teen\" and unemployment ran out. Still looking at job hunting. Pt was given lists of [address] [facility] as well as [facility]. Will explore this afternoon. Dr & tx team updated.

## 2021-11-30 NOTE — PROGRESS NOTES
[address]  Inpatient Progress Note     Date of Service: 11/30/21  Hospital Day: 3     Subjective/Interval History   11/30/21    Treatment Team Notes:  Notes reviewed and/or discussed and report that Marilin Montemayor is a patient recently admitted to the facility, attention invited to the dictated admission note which is self-explanatory. Patient interview: Marilin Montemayor was interviewed by this writer today. During session the patient continues to describe her being helpless and hopeless, indicating that her mother does not appear to understand the situation in which she is currently in. Treatment with citalopram was started with with good tolerance being noted. The dose will be increased to 20 mg every morning starting tomorrow. We will proceed to repeat the patient's hepatic function panel and order also a hepatitis C antibody testing. In my opinion the elevated liver function tests are related to fatty liver, however. If the study comes back negative, liver ultrasound will be requested. Objective     Visit Vitals  BP (!) 148/84 (BP 1 Location: Left lower arm, BP Patient Position: Sitting)   Pulse 87   Temp 97.8 °F (36.6 °C)   Resp 16   Ht 5' 3.6\" (1.615 m)   Wt (!) 175.5 kg (387 lb)   SpO2 97%   Breastfeeding No   BMI 67.27 kg/m²     Blood pressure is improving slowly. Morbidly obese, nutritional consult will follow. No results found for this or any previous visit (from the past 24 hour(s)). Mental Status Examination     Appearance/Hygiene 42 y.o.  BLACK/ female  Hygiene: Fair   Behavior/Social Relatedness Appropriate   Musculoskeletal Gait/Station: appropriate  Tone (flaccid, cogwheeling, spastic): not assessed  Psychomotor (hyperkinetic, hypokinetic): calm   Involuntary movements (tics, dyskinesias, akathisa, stereotypies): none   Speech   Rate, rhythm, volume, fluency and articulation are appropriate   Mood   depressed   Affect    appropriate to situation   Thought Process Linear and goal directed   Thought Content and Perceptual Disturbances  suicidal thoughts are improving, however she remains helpless and hopeless. Able to CFS while in the hospital.  Denies auditory and visual hallucinations   Sensorium and Cognition  Grossly intact   Insight  improving slowly   Judgment  improving slowly        Assessment/Plan      Psychiatric Diagnoses:   Patient Active Problem List   Diagnosis Code    Back pain M54.9    Left knee pain M25.562    Major depressive disorder, recurrent episode, severe with anxious distress (HCC) F33.2    Class 3 severe obesity in adult [facility]) E66.01       Medical Diagnoses: Plus hypertension    Psychosocial and contextual factors: Same    Level of impairment/disability: Moderately disabled     1. Treatment with citalopram will be increased to 20 mg daily. We will repeat hepatic function panel to be sure that her liver function tests have not worsened since treatment with citalopram was a started. In addition we will request a hepatitis C antibody testing and a nutritional consult due to the patient's morbid obesity. She may require to have a liver ultrasound, will make a decision tomorrow. 2.  Reviewed instructions, risks, benefits and side effects of medications  3.   Disposition/Discharge Date: self-care/home, OSMEL Umanzor MD, [address] [facility]  Psychiatry

## 2021-11-30 NOTE — PROGRESS NOTES
Problem: Suicide  Goal: *STG: Remains safe in hospital  Description: AEB remaining safe and free from harm daily while hospitalized. Outcome: Progressing Towards Goal  Goal: *STG: Attends activities and groups  Description: AEB attending at least 3-4 groups/activities daily while hospitalized. Outcome: Progressing Towards Goal  Goal: *STG/LTG: Complies with medication therapy  Description: AEB taking all medication as prescribed daily while hospitalized. Outcome: Progressing Towards Goal       Tali Vasquez is  med compliant. She did not eat breakfast citing loss of appetite. She is free from falls and harm. She presents as depressed and withdrawn, though she is attending groups. She voiced complaint about a disruptive roommate yet declined switching rooms. Will continue to provide support as needed.

## 2021-12-01 LAB
ALBUMIN SERPL-MCNC: 3.4 G/DL (ref 3.4–5)
ALBUMIN/GLOB SERPL: 0.8 {RATIO} (ref 0.8–1.7)
ALP SERPL-CCNC: 75 U/L (ref 45–117)
ALT SERPL-CCNC: 58 U/L (ref 13–56)
AST SERPL-CCNC: 43 U/L (ref 10–38)
BILIRUB DIRECT SERPL-MCNC: 0.1 MG/DL (ref 0–0.2)
BILIRUB SERPL-MCNC: 0.4 MG/DL (ref 0.2–1)
GLOBULIN SER CALC-MCNC: 4.1 G/DL (ref 2–4)
HCV AB SER IA-ACNC: <0.02 INDEX
HCV AB SERPL QL IA: NEGATIVE
HCV COMMENT,HCGAC: NORMAL
PROT SERPL-MCNC: 7.5 G/DL (ref 6.4–8.2)

## 2021-12-01 PROCEDURE — 99232 SBSQ HOSP IP/OBS MODERATE 35: CPT | Performed by: PSYCHIATRY & NEUROLOGY

## 2021-12-01 PROCEDURE — 36415 COLL VENOUS BLD VENIPUNCTURE: CPT

## 2021-12-01 PROCEDURE — 86803 HEPATITIS C AB TEST: CPT

## 2021-12-01 PROCEDURE — 74011250637 HC RX REV CODE- 250/637: Performed by: PSYCHIATRY & NEUROLOGY

## 2021-12-01 PROCEDURE — 80076 HEPATIC FUNCTION PANEL: CPT

## 2021-12-01 PROCEDURE — 65220000003 HC RM SEMIPRIVATE PSYCH

## 2021-12-01 RX ADMIN — CITALOPRAM HYDROBROMIDE 20 MG: 20 TABLET ORAL at 08:57

## 2021-12-01 RX ADMIN — Medication: at 17:18

## 2021-12-01 RX ADMIN — IBUPROFEN 400 MG: 400 TABLET ORAL at 17:16

## 2021-12-01 RX ADMIN — TRIAMCINOLONE ACETONIDE: 1 OINTMENT TOPICAL at 08:58

## 2021-12-01 RX ADMIN — HYDROXYZINE PAMOATE 50 MG: 50 CAPSULE ORAL at 20:28

## 2021-12-01 RX ADMIN — LISINOPRIL 20 MG: 20 TABLET ORAL at 08:57

## 2021-12-01 RX ADMIN — HYDROXYZINE PAMOATE 50 MG: 50 CAPSULE ORAL at 12:18

## 2021-12-01 NOTE — BH NOTES
Rounds were done at the beginning of the shift by the shift that was getting ready to hand the shift over.

## 2021-12-01 NOTE — BH NOTES
PAMELA Note: Staff performed rounds with the on-coming shift and gave shift report prior to turning over to the on coming shift.

## 2021-12-01 NOTE — PROGRESS NOTES
Treatment team met -     Medical Director: _____present   Psychiatrist: __x___present   Charge nurse: __x___present   MSW: __x___present   : _____present   Nurse Manager: _____present   Student RNs: _____present   Medical Students: _____present   Art Therapist: _____present   Clinical Coordinator: _x____present    Occupational Therapist: _____present   : _______ present    _______ present  Crisis Supervisor_______present  Patient:__x_____ present      Plan of care discussed and updated as appropriate. Patient is homeless,she was living with a sister but was told she had to leave. Patient stated she was angry about things but mainly angry with the sister. The  provided the patient with a list of Board and Care providers and the shelter list to call for assistance, patient stated having no income.

## 2021-12-01 NOTE — GROUP NOTE
KARLENE  GROUP DOCUMENTATION INDIVIDUAL                                                                          Group Therapy Note    Date: 12/1/2021    Group Start Time: 1330  Group End Time: 1999  Group Topic: Nursing    [facility] ADULT CHEM [address] [facility], RN     [facility] GROUP    Group Therapy Note    Attendees: 7         Attendance: Did not attend              Tyron Nieves RN

## 2021-12-01 NOTE — PROGRESS NOTES
Problem: Falls - Risk of  Goal: *Absence of Falls  Description: Document [place]ina [place] Fall Risk and appropriate interventions in the flowsheet. Patient denied hx of falls, daily will remain fall free while in the hospital.  12/1/2021 1230 by Sandra Hutson RN  Outcome: Progressing Towards Goal  Note: Fall Risk Interventions:    12/1/2021 1049 by Sandra Hutson RN  Outcome: Progressing Towards Goal  Note: Fall Risk Interventions:       Problem: Suicide  Goal: *STG: Remains safe in hospital  Description: Peyton Pac remaining safe and free from harm daily while hospitalized. 12/1/2021 1230 by Sandra Hutson RN  Outcome: Progressing Towards Goal  12/1/2021 1049 by Sandra Hutson RN  Outcome: Progressing Towards Goal  Goal: *STG: Attends activities and groups  Description: AEB attending at least 3-4 groups/activities daily while hospitalized. Outcome: Progressing Towards Goal  Goal: *STG/LTG: Complies with medication therapy  Description: AEB taking all medication as prescribed daily while hospitalized. 12/1/2021 1230 by Sandra Hutson RN  Outcome: Progressing Towards Goal  12/1/2021 1049 by Sandra Hutson RN  Outcome: Progressing Towards Goal     Problem: Hypertension  Goal: *Blood pressure within specified parameters  Description: AEB maintaining blood pressure within normal limits (SBP<150 or >110, and DBP <90 or >60) daily while hospitalized. 12/1/2021 1230 by Sandra Hutson RN  Outcome: Not Progressing Towards Goal  12/1/2021 1049 by Sandra Hutson RN  Outcome: Progressing Towards Goal    Pt appeared to have flat affect during the morning. Pt was medication compliant. Pt bp was elevated pt did take medication. Vitals were taking again after medication. Pt denies any SI/HI. Pt denies headache or dizziness. Pt reported feeling anxious and pt wanted vistaril for anxiety. Staff provided patient with PRN vistaril. Pt participated in groups. Later during the shift patient appeared to have a brighter affect.  Pt will continued to be monitored.

## 2021-12-01 NOTE — BSMART NOTE
ART THERAPY GROUP PROGRESS NOTE    PATIENT SCHEDULED FOR GROUP AT: 10:00    ATTENDANCE: Full    PARTICIPATION LEVEL: Participates fully in the art process    ATTENTION LEVEL : Able to focus on task    FOCUS: Mindfulness     SYMBOLIC & THEMATIC CONTENT AS NOTED IN IMAGERY: She was calm, compliant, and occasionally interacted with peers. She presented with a brighter affect than noted in yesterday's group and was more invested in the task at hand. She shared her belief that the only way she can obtain her goal of happiness and self love is getting \"vengeance and revenge. \" She remained vague and did not elaborate on whom she desired to seek revenge or what the situation was, only claimed that she has been \"very angry. \" Group discussed potential consequences that result from anger-driven decisions and actions with focus on revenge, which she seemed somewhat receptive, but remained guarded about the topic.

## 2021-12-01 NOTE — PROGRESS NOTES
[address] [facility] [address]  Inpatient Progress Note     Date of Service: 12/01/21  Hospital Day: 4     Subjective/Interval History   12/01/21    Treatment Team Notes:  Notes reviewed and/or discussed and report that Renate Oseguera is a patient recently admitted to the facility, attention being invited to the dictated admission note which is self-explanatory. The patient was present during the treatment team session this morning. She participated appropriately. Patient interview: Renate Oseguera was interviewed by this writer today. During our session today, the patient continued to described the presence of depression and suicidal thoughts, with her also describing the presence of increased anger which she has problems dealing with. She mentioned that she is concerned of her latching out to individuals she should not be angry with, including her mother. He apparently happened when talking to her mother yesterday that she became very angry and again directed her anger towards her. She knows that that was inappropriate, with the steps as to how to deal with her anger and appropriate ways of expressing it following. Objective     Visit Vitals  BP (!) 178/97 (BP 1 Location: Left upper arm, BP Patient Position: At rest)   Pulse 100   Temp 97 °F (36.1 °C)   Resp 20   Ht 5' 3.6\" (1.615 m)   Wt (!) 175.5 kg (387 lb)   SpO2 97%   Breastfeeding No   BMI 67.27 kg/m²     The patient remains hypertensive, very limited response to current treatment with lisinopril noted. Clonidine has been ordered. Recent Results (from the past 24 hour(s))   HEPATIC FUNCTION PANEL    Collection Time: 12/01/21  7:19 AM   Result Value Ref Range    Protein, total 7.5 6.4 - 8.2 g/dL    Albumin 3.4 3.4 - 5.0 g/dL    Globulin 4.1 (H) 2.0 - 4.0 g/dL    A-G Ratio 0.8 0.8 - 1.7      Bilirubin, total 0.4 0.2 - 1.0 MG/DL    Bilirubin, direct 0.1 0.0 - 0.2 MG/DL    Alk.  phosphatase 75 45 - 117 U/L    AST (SGOT) 43 (H) 10 - 38 U/L    ALT (SGPT) 58 (H) 13 - 56 U/L   HEPATITIS C AB    Collection Time: 12/01/21  7:19 AM   Result Value Ref Range    Hepatitis C virus Ab <0.02 <0.80 Index    Hep C virus Ab Interp. Negative NEG      Hep C  virus Ab comment           Above results noted, liver function tests are improving. We will request a liver ultrasound. Mental Status Examination     Appearance/Hygiene 42 y.o. BLACK/ female  Hygiene: Fair   Behavior/Social Relatedness Appropriate, angry at times   Musculoskeletal Gait/Station: appropriate  Tone (flaccid, cogwheeling, spastic): not assessed  Psychomotor (hyperkinetic, hypokinetic): calm   Involuntary movements (tics, dyskinesias, akathisa, stereotypies): none   Speech   Rate, rhythm, volume, fluency and articulation are appropriate   Mood   depressed   Affect    irritable   Thought Process Linear and goal directed   Thought Content and Perceptual Disturbances  suicidal thoughts are present however less intense and less frequent. Denies auditory and visual hallucinations, ideas of reference or influence or any hallucinations. Sensorium and Cognition  Grossly intact   Insight  improving slowly   Judgment  improving slowly        Assessment/Plan      Psychiatric Diagnoses:   Patient Active Problem List   Diagnosis Code    Back pain M54.9    Left knee pain M25.562    Major depressive disorder, recurrent episode, severe with anxious distress (HCC) F33.2    Class 3 severe obesity in adult [facility]) E66.01       Medical Diagnoses: Plus hypertension    Psychosocial and contextual factors: Same    Level of impairment/disability: Moderate    1. We will continue current treatment with citalopram.  Clonidine has been prescribed as a as needed however it is not clear as to why not being dispensed to the patient. We will asked the staff to recheck the patient's blood pressure and to proceed with the prescription as indicated.   2.  Reviewed instructions, risks, benefits and side effects of medications  3.   Disposition/Discharge Date: self-care/home, TBD    Bridgett Callaway MD, [address] [place] [address]

## 2021-12-01 NOTE — CONSULTS
Comprehensive Nutrition Assessment    Type and Reason for Visit: Initial, Consult    Nutrition Recommendations/Plan:  - Add low-sodium diet restriction to diet order.  - General healthy, low-sodium diet education discussed with pt today. - Update pt stated food allergies. Nutrition Assessment:  Regular diet with good appetite and po intake. Tolerating diet with noted weight gain over the past several years. Discussed importance of general healthy diet for encouragement of wt loss. Pt denied need for further educational handouts, states she knows what to do but has difficulty finding motivation to implement healthier changes. Encouragement provided. Malnutrition Assessment:  Malnutrition Status:  No malnutrition      Nutrition History and Allergies: PMHx- morbid obesity, depression, chronic knee pain and HTN. Presented with c/o SI with a plan in place to OD and drown in the river with recent stressors including unemployment, financial hardships and recently homeless. Denies changes in appetite or weight PTA with a few days of decreased intake due to food accessibility hardships. Wt hx per chart 340# (8/27/16), 337# (3/15/19) & 387# (11/26/21). Pt reported food allergy to shellfish, fish and nuts. Estimated Daily Nutrient Needs:  Energy (kcal): 4958-3810; Weight Used for Energy Requirements: Current (175 kg)  Protein (g): 140-175; Weight Used for Protein Requirements: Current (0.8-1)  Fluid (ml/day): 5246-5248; Method Used for Fluid Requirements: 1 ml/kcal     Nutrition Related Findings:  HbA1c of 6.1% (11/29/21). Hypertensive receiving lisinopril      Wounds:    None       Current Nutrition Therapies:  ADULT DIET Regular;  Low Sodium (2 gm)    Anthropometric Measures:  · Height:  5' 3.6\" (161.5 cm)  · Current Body Wt:  175.5 kg (387 lb)   · Admission Body Wt:  387 lb    · Usual Body Wt:  152.9 kg (337 lb) (3/15/19 per chart)     · Ideal Body Wt:  118 lbs:  328 %   · BMI Category:  Obese class 3 (BMI 40.0 or greater)       Nutrition Diagnosis:   · Overweight/obesity related to psychological cause or life stress (undesireable food choices) as evidenced by BMI    Nutrition Interventions:   Food and/or Nutrient Delivery: Modify current diet  Nutrition Education and Counseling: Education completed (general healthy, low-sodium diet discussed 12/1)  Coordination of Nutrition Care: Continue to monitor while inpatient    Goals:  Weight loss of 1-2 lbs over the next 14 days.        Nutrition Monitoring and Evaluation:   Behavioral-Environmental Outcomes: Beliefs and attitudes, Knowledge or skill, Readiness for change  Food/Nutrient Intake Outcomes: Food and nutrient intake  Physical Signs/Symptoms Outcomes: Meal time behavior, Nutrition focused physical findings, Weight    Discharge Planning:    Continue current diet, Recommend pursue outpatient nutrition counseling     Electronically signed by Roxanna Chua RD on 12/1/2021 at 10:33 AM    Contact: [phone]

## 2021-12-01 NOTE — BSMART NOTE
History of Presenting Problem:  Pt is a 70-year-old female, single, admitted from [facility] Emergency Department with a history of being increasingly depressed and suicidal with a plan of jumping into the [facility] and drown, \"since I do not know how to swim. \"  Pt reports she staying with sister at a local motel basically told her to leave the premises.  Helpless, hopeless, anhedonic, the patient described being overwhelmed by her situation and became increasingly suicidal. Pt currently unemployed past year, now homeless with almost no support system. Pain management issues.      Patient reports she Is feeling better today as she presented to the treatment team.  Patient reports she is unsure of her direction upon discharge. She reports she will possibly not be allowed to return to her sisters home. SW addressed  Possible shelter and addressed alternative programs who assist with additional resources. SW addressed follow up care to ensure continuity of mental health care. SW will continue to monitor patient and will assist as needed.     Stefan Bunn, MEENA-E

## 2021-12-01 NOTE — BH NOTES
Pt presents with dull affect, depressed mood, anxious at times. Pt has been selectively social on the unit. Pt made several phone calls this evening, and appeared to be argumentative on the phone. Pt denies SI/HI at this time. Will continue to monitor.

## 2021-12-02 ENCOUNTER — APPOINTMENT (OUTPATIENT)
Dept: ULTRASOUND IMAGING | Age: 41
DRG: 751 | End: 2021-12-02
Attending: PSYCHIATRY & NEUROLOGY
Payer: COMMERCIAL

## 2021-12-02 PROBLEM — I10 PRIMARY HYPERTENSION: Status: ACTIVE | Noted: 2021-12-02

## 2021-12-02 PROCEDURE — 74011250637 HC RX REV CODE- 250/637: Performed by: PSYCHIATRY & NEUROLOGY

## 2021-12-02 PROCEDURE — 65220000003 HC RM SEMIPRIVATE PSYCH

## 2021-12-02 PROCEDURE — 99232 SBSQ HOSP IP/OBS MODERATE 35: CPT | Performed by: PSYCHIATRY & NEUROLOGY

## 2021-12-02 PROCEDURE — 76705 ECHO EXAM OF ABDOMEN: CPT

## 2021-12-02 RX ORDER — AMLODIPINE BESYLATE 5 MG/1
5 TABLET ORAL DAILY
Status: DISCONTINUED | OUTPATIENT
Start: 2021-12-02 | End: 2021-12-06

## 2021-12-02 RX ADMIN — CITALOPRAM HYDROBROMIDE 20 MG: 20 TABLET ORAL at 09:26

## 2021-12-02 RX ADMIN — TRAZODONE HYDROCHLORIDE 50 MG: 50 TABLET ORAL at 20:26

## 2021-12-02 RX ADMIN — IBUPROFEN 400 MG: 400 TABLET ORAL at 20:26

## 2021-12-02 RX ADMIN — HYDROXYZINE PAMOATE 50 MG: 50 CAPSULE ORAL at 20:26

## 2021-12-02 RX ADMIN — CLONIDINE HYDROCHLORIDE 0.1 MG: 0.1 TABLET ORAL at 11:38

## 2021-12-02 RX ADMIN — HYDROXYZINE PAMOATE 50 MG: 50 CAPSULE ORAL at 09:26

## 2021-12-02 RX ADMIN — IBUPROFEN 400 MG: 400 TABLET ORAL at 09:26

## 2021-12-02 RX ADMIN — AMLODIPINE BESYLATE 5 MG: 5 TABLET ORAL at 11:27

## 2021-12-02 RX ADMIN — LISINOPRIL 20 MG: 20 TABLET ORAL at 09:26

## 2021-12-02 NOTE — BH NOTES
Pt presents with bright affect, eutyhmic mood. Pt has been selectively social on the unit. Pt has been reading in her room for much of the evening. Pt is participative in groups and is adherent with unit guidelines. Pt denies SI/HI at this time. Pt is medication compliant. Will continue to monitor.

## 2021-12-02 NOTE — BSMART NOTE
Social Work Group 12/2/21   DISCHARGE PLANNING GROUP    Attendance  Declined    Number of participants 11   Time in 12:15   Time out 1:00   Total Time 45   Interaction Listen     Patient was encouraged to attend group. Patient declined. Patient was resting in the room.       Nicole Nunez MA, LMHP-R

## 2021-12-02 NOTE — GROUP NOTE
KARLENE  GROUP DOCUMENTATION INDIVIDUAL                                                                          Group Therapy Note    Date: 12/2/2021    Group Start Time: 1400  Group End Time: 3320  Group Topic: Nursing    [facility] ADULT CHEM [facility] [facility], RN    [facility] GROUP DOCUMENTATION GROUP    Group Therapy Note    Attendees: 2         Attendance: Did not attend            Chichi Davis RN

## 2021-12-02 NOTE — PROGRESS NOTES
Problem: Falls - Risk of  Goal: *Absence of Falls  Description: Document [place] Fall Risk and appropriate interventions in the flowsheet. Patient denied hx of falls, daily will remain fall free while in the hospital.  Outcome: Progressing Towards Goal  Note: Fall Risk Interventions     Problem: Suicide  Goal: *STG: Remains safe in hospital  Description: AEB remaining safe and free from harm daily while hospitalized. Outcome: Progressing Towards Goal  Goal: *STG: Seeks staff when feelings of self harm or harm towards others arise  Description: AEB seeking staff when feelings of harm towards self/others arise, for each occurrence, daily while hospitalized. Outcome: Progressing Towards Goal  Goal: *STG: Attends activities and groups  Description: AEB attending at least 3-4 groups/activities daily while hospitalized. Outcome: Progressing Towards Goal  Goal: *STG/LTG: Complies with medication therapy  Description: AEB taking all medication as prescribed daily while hospitalized. Outcome: Progressing Towards Goal     Problem: Hypertension  Goal: *Blood pressure within specified parameters  Description: AEB maintaining blood pressure within normal limits (SBP<150 or >110, and DBP <90 or >60) daily while hospitalized. Outcome: Progressing Towards Goal    Pt was pleasant but appeared to have a brighter affect. Pt was in NPO until her liver Biopsy. PT bp was elevated. Pt took medication for bp and to help anxiety. Pt reported she was in pain which staff provided medication for. Pt was medication compliant. Pt attended groups. Pt will continued to be monitored.

## 2021-12-02 NOTE — PROGRESS NOTES
Staff rechecked bp after given her medication. bp slightly decreased. Pt will continued to be monitored.

## 2021-12-02 NOTE — BH NOTES
Pt presents with bright affect, depressed mood. Pt has been watching television in the day area for much of the evening. Pt is participative in groups and is adherent with unit guidelines. Pt denies SI/HI at this time. Pt is medication compliant. Will continue to monitor.

## 2021-12-02 NOTE — PROGRESS NOTES
[address] [facility] [address]  Inpatient Progress Note     Date of Service: 12/02/21  Hospital Day: 5     Subjective/Interval History   12/02/21    Treatment Team Notes:  Notes reviewed and/or discussed and report that Susan Sullivan is a patient with a history of depression, attention invited to the dictated admission note which is self-explanatory. Patient interview: Susan Sullivan was interviewed by this writer today. During session today the patient described having increased difficulties dealing with the anger associated with her being homeless. A complete lack of support from her sister appears to be the trigger, with her anger being a very acceptable response to her stressors. However she remains concerned that she will be displacing her anger towards someone else that is not at fault. So decision was brought up during the session today, with ways to deal with her anger appropriately being brought up. Objective     Visit Vitals  BP (!) 163/95 (BP 1 Location: Left upper arm, BP Patient Position: At rest)   Pulse 91   Temp 98.1 °F (36.7 °C)   Resp 20   Ht 5' 3.6\" (1.615 m)   Wt (!) 175.5 kg (387 lb)   SpO2 97%   Breastfeeding No   BMI 67.27 kg/m²     The patient remains hypertensive. Current dose of lisinopril is not effective. We will proceed to after treatment with amlodipine. No results found for this or any previous visit (from the past 24 hour(s)). Mental Status Examination     Appearance/Hygiene 42 y.o.  BLACK/ female  Hygiene: Somewhat limited   Behavior/Social Relatedness Appropriate   Musculoskeletal Gait/Station: appropriate  Tone (flaccid, cogwheeling, spastic): not assessed  Psychomotor (hyperkinetic, hypokinetic): calm   Involuntary movements (tics, dyskinesias, akathisa, stereotypies): none   Speech   Rate, rhythm, volume, fluency and articulation are appropriate   Mood   depressed   Affect    increasingly angry, irritable   Thought Process Linear and goal directed   Thought Content and Perceptual Disturbances  suicidal thoughts are still present, however the intensity has improved. She remains helpless and hopeless. Denies auditory and visual hallucinations, ideas of reference or influence or any delusional thoughts. Sensorium and Cognition  Grossly intact   Insight  improving slowly   Judgment  improving slowly        Assessment/Plan      Psychiatric Diagnoses:   Patient Active Problem List   Diagnosis Code    Back pain M54.9    Left knee pain M25.562    Major depressive disorder, recurrent episode, severe with anxious distress ([facility]) F33.2    Class 3 severe obesity in adult [facility]) E66.01       Medical Diagnoses: Plus hypertension    Psychosocial and contextual factors: Same    Level of impairment/disability: Moderately disabled    1. We will add treatment with amlodipine 5 mg every morning. Treatment with citalopram is being well-tolerated. The patient did have her liver ultrasound this morning. Results are pending. 2.  Reviewed instructions, risks, benefits and side effects of medications  3. Disposition/Discharge Date: self-care/home, to be determined.     Vishal Pickering MD, [address] [place] [address]  Psychiatry

## 2021-12-02 NOTE — PROGRESS NOTES
11:27: pt was hypertensive with a blood pressure of 160/103. Pt denies headache, no dizziness. Pt was medicated *See Mar*. Pt will be rechecked in 1 hour. Pt will continued to be monitored.

## 2021-12-02 NOTE — BH NOTES
PAMELA note: Rounds were done at the beginning of the shift by the shift that was getting ready to hand the shift over.

## 2021-12-02 NOTE — GROUP NOTE
IP  GROUP DOCUMENTATION INDIVIDUAL                                                                          Group Therapy Note    Date: 12/2/2021    Group Start Time: 2804  Group End Time: 1630  Group Topic: Reflection/Relaxation    [facility] 1 ADULT CHEM DEP    Aditi Garcia RN    IP [address] [facility] GROUP DOCUMENTATION GROUP    Group Therapy Note    Attendees: 9         Attendance: Attended    Patient's Goal:  How to cope with anxiety? Interventions/techniques: Informed, Validated and Promoted peer support    Follows Directions:  Followed directions    Interactions: Interacted appropriately    Mental Status: Calm    Behavior/appearance: Cooperative    Goals Achieved: Able to listen to others        Marlene Jang RN

## 2021-12-02 NOTE — BSMART NOTE
SOCIAL WORK GROUP THERAPY PROGRESS NOTE    Group Time:  10:15am to 11am    Group Topic:  Coping Skills    C D Issues    Group Participation:      Pt moderately involved during group discussion but remained attentive. Affect somewhat flatter & still dysphoric. \"Seven Steps\" for taking responsibility for our Happiness was reviewed including commitment to change, self-care, setting limits, goal setting & letting go. Pt somewhat ambivalent not defining areas to change. Explored \"my body's\" anger warning signs as well as common triggers.

## 2021-12-02 NOTE — GROUP NOTE
KARLENE  GROUP DOCUMENTATION INDIVIDUAL                                                                          Group Therapy Note    Date: 12/1/2021    Group Start Time: 2000  Group End Time: 2030  Group Topic: Medication    [facility] 1 ADULT CHEM DEP    Libby Bell RN    [facility] GROUP DOCUMENTATION GROUP    Group Therapy Note    Attendees:5         Attendance: Attended    Patient's Goal:  Understanding the importance of medication compliance. Interventions/techniques: Informed and Validated    Follows Directions: Followed directions    Interactions: Interacted appropriately    Mental Status: Calm    Behavior/appearance: Cooperative    Goals Achieved:  Identified feelings      Areli Norris RN

## 2021-12-02 NOTE — BSMART NOTE
SW Contact:     Pt is a 18-year-old female, single, [facility] with a history of being increasingly depressed and suicidal with a plan of jumping into the [facility] and drown, \"since I do not know how to swim. \"   Helpless, hopeless, anhedonic, & now homeless with almost no support system. Pain management issues also.     Today: pt continues to be overwhelmed & putting little energy into tx plan     CLINICAL IMPRESSION:  AXIS I:  Major depressive disorder, recurrent with anxious distress without psychotic symptoms. AXIS II:  Noncontributory. AXIS III:  Hypertension by history; morbidly obese; history of chronic lumbosacral spine pain; bilateral knee pain; history of midsternal chest pain, remote; history of adverse effects to sulfate salts, which produce headaches; and penicillins, unknown reaction. Interaction: briefly reminded pt her responsibility to explore housing options. Affect still flat & mood dysphoric      Dr & tx team updated.

## 2021-12-02 NOTE — GROUP NOTE
KARLENE  GROUP DOCUMENTATION INDIVIDUAL                                                                          Group Therapy Note    Date: 12/1/2021    Group Start Time: 2000  Group End Time: 2015  Group Topic: Nursing    [facility] 1 ADULT CHEM Breanna Randhawa    IP [address] [facility] GROUP DOCUMENTATION GROUP    Group Therapy Note    Attendees: 6         Attendance: Attended    Patient's Goal:  Encourage peers     Interventions/techniques: Challenged    Follows Directions:  Followed directions    Interactions: Interacted appropriately    Mental Status: Calm    Behavior/appearance: Caretaking    Goals Achieved: Able to reflect/comment on own behavior        Mayelin Mcneill

## 2021-12-03 PROCEDURE — 74011250637 HC RX REV CODE- 250/637: Performed by: PSYCHIATRY & NEUROLOGY

## 2021-12-03 PROCEDURE — 65220000003 HC RM SEMIPRIVATE PSYCH

## 2021-12-03 PROCEDURE — 99232 SBSQ HOSP IP/OBS MODERATE 35: CPT | Performed by: PSYCHIATRY & NEUROLOGY

## 2021-12-03 RX ADMIN — TRAZODONE HYDROCHLORIDE 50 MG: 50 TABLET ORAL at 20:29

## 2021-12-03 RX ADMIN — HYDROXYZINE PAMOATE 50 MG: 50 CAPSULE ORAL at 20:29

## 2021-12-03 RX ADMIN — CITALOPRAM HYDROBROMIDE 20 MG: 20 TABLET ORAL at 09:11

## 2021-12-03 RX ADMIN — IBUPROFEN 400 MG: 400 TABLET ORAL at 20:29

## 2021-12-03 RX ADMIN — IBUPROFEN 400 MG: 400 TABLET ORAL at 09:11

## 2021-12-03 RX ADMIN — LISINOPRIL 20 MG: 20 TABLET ORAL at 09:11

## 2021-12-03 RX ADMIN — AMLODIPINE BESYLATE 5 MG: 5 TABLET ORAL at 09:11

## 2021-12-03 RX ADMIN — HYDROXYZINE PAMOATE 50 MG: 50 CAPSULE ORAL at 10:24

## 2021-12-03 NOTE — BSMART NOTE
SW Contact:      Pt is a 65-year-old female, single, [facility] with a history of being increasingly depressed and suicidal with a plan of jumping into the [facility] and drown, \"since I do not know how to swim. \"   Helpless, hopeless, anhedonic, & now homeless with almost no support system. Pain management issues also.     Today: seems pt more overwhelmed & ambivalent about making effort about housing.     CLINICAL IMPRESSION:  AXIS I:  Major depressive disorder, recurrent with anxious distress without psychotic symptoms. AXIS II:  Noncontributory. AXIS III:  Hypertension by history; morbidly obese; history of chronic lumbosacral spine pain; bilateral knee pain; history of midsternal chest pain, remote; history of adverse effects to sulfate salts, which produce headaches; and penicillins, unknown reaction.      Interaction: reviewed again with pt the #'s of whom to call regarding housing. She still hasn't connected with a couple & encouraged to try and she verbalized will make more effort today.  & tx team updated.

## 2021-12-03 NOTE — PROGRESS NOTES
Problem: Falls - Risk of  Goal: *Absence of Falls  Description: Document Gini Horowitz Fall Risk and appropriate interventions in the flowsheet. Patient denied hx of falls, daily will remain fall free while in the hospital.  Outcome: Progressing Towards Goal  Note: Fall Risk Interventions:                                Problem: Suicide  Goal: *STG: Remains safe in hospital  Description: AEB remaining safe and free from harm daily while hospitalized. Outcome: Progressing Towards Goal  Goal: *STG: Seeks staff when feelings of self harm or harm towards others arise  Description: AEB seeking staff when feelings of harm towards self/others arise, for each occurrence, daily while hospitalized. Outcome: Progressing Towards Goal  Goal: *STG: Attends activities and groups  Description: AEB attending at least 3-4 groups/activities daily while hospitalized. Outcome: Progressing Towards Goal   Patient states she is doing ok. She is anxious at times due to her may being homeless once discharged. She is medication complaint, denies SI. Attending groups.

## 2021-12-03 NOTE — BH NOTES
PRN trazodone for sleep given as per Pt's request. Will continue to monitor for safety and provide support as needed.

## 2021-12-03 NOTE — PROGRESS NOTES
[address] [facility] [address]  Inpatient Progress Note     Date of Service: 12/03/21  Hospital Day: 6     Subjective/Interval History   12/03/21    Treatment Team Notes:  Notes reviewed and/or discussed and report that Mauricio Wilhelm is a patient with a history of depression, multifactorial but mostly associated to her current difficulties with homelessness. Attention is invited to detailed admission note which is self-explanatory. Patient interview: Mauricio Wilhelm was interviewed by this writer today. During our session today, we continue to discuss the fact that his discharge will possibly require this coming Monday. The patient's depression has improved, the same vascular suicidality, however the same as stressors remain. She has been giving several telephone numbers to call hoping to help with placement, as of now to no avail. During the session we also discussed with the patient the results of the abdominal ultrasound which is abnormal and shows the presence of stones in the gallbladder, however no evidence of cholelithiasis. There is no evidence of cholecystitis either. However the liver does show changes compatible with steatosis which was our clinical impression before the test confirming it. The patient was informed about the test results. Objective     Visit Vitals  BP (!) 140/97   Pulse 89   Temp 98.3 °F (36.8 °C)   Resp 17   Ht 5' 3.6\" (1.615 m)   Wt (!) 175.5 kg (387 lb)   SpO2 97%   Breastfeeding No   BMI 67.27 kg/m²     Still having problems with an elevated blood pressure, however she is currently tolerating the addition of Norvasc 5 mg daily to lisinopril 20 mg every day. No results found for this or any previous visit (from the past 24 hour(s)). Mental Status Examination     Appearance/Hygiene 42 y.o.  BLACK/ female  Hygiene: Fair   Behavior/Social Relatedness  withdrawn and hypoactive at times   Musculoskeletal Gait/Station: appropriate  Tone (flaccid, cogwheeling, spastic): not assessed  Psychomotor (hyperkinetic, hypokinetic): calm   Involuntary movements (tics, dyskinesias, akathisa, stereotypies): none   Speech   Rate, rhythm, volume, fluency and articulation are appropriate   Mood   depressed   Affect    appropriate to situation   Thought Process Linear and goal directed   Thought Content and Perceptual Disturbances Denies self-injurious behavior (SIB), suicidal ideation (SI), aggressive behavior or homicidal ideation (HI)  Denies auditory and visual hallucinations, ideas of reference or ambulance or any delusional thoughts   Sensorium and Cognition  Grossly intact   Insight  improving   Judgment  improving        Assessment/Plan      Psychiatric Diagnoses:   Patient Active Problem List   Diagnosis Code    Back pain M54.9    Left knee pain M25.562    Major depressive disorder, recurrent episode, severe with anxious distress ([facility]) F33.2    Class 3 severe obesity in adult ([facility] [address] E66.01    Primary hypertension I10       Medical Diagnoses: As above. Psychosocial and contextual factors: Same    Level of impairment/disability: Moderately disabled    1. The patient is tolerating current combination of medications well including the above-mentioned lisinopril and amlodipine the same as current treatment with citalopram.  We will be repeating the patient's hepatic function panel on Sunday hoping to see that the improvement that she has shown since admission will continue in that respect. However the patient has evidence by ultrasound of steatosis the same as stones in the gallbladder. The results were provided to the patient. Otherwise she was informed that Dr. Karis Jose  will be seeing her for me over the weekend and I will be back on Monday  2. Reviewed instructions, risks, benefits and side effects of medications  3. Disposition/Discharge Date: self-care/home, possibly by Monday.     Mejia Buchanan MD, [facility] [place]  Psychiatry

## 2021-12-03 NOTE — BSMART NOTE
SOCIAL WORK GROUP THERAPY PROGRESS NOTE    Group Time:  10:15am to 10:55am    Group Topic:  Coping Skills    C D Issues    Group Participation:      Pt moderately involved during group discussion but remained attentive. Still dysphoric but affect brighter. Members discussed handout on the role of \"neurotransmitters\" and how they regulate different aspects of one's moods, cognitions & related behavior. Did identify needs to not worry about future & stop jumping to conclusions. Again reminded of strategies to keep a \"Journal\" for moods, cognitions, behavior & outcome.

## 2021-12-03 NOTE — GROUP NOTE
KARLENE  GROUP DOCUMENTATION INDIVIDUAL                                                                          Group Therapy Note    Date: 12/2/2021    Group Start Time: 2030  Group End Time: 2100  Group Topic: Community Meeting    [facility] 1 ADULT CHEM DEP    Chrissy Smith    IP [facility] [facility] GROUP DOCUMENTATION GROUP    Group Therapy Note    Attendees: 3         Attendance: Did not attend        Additional Notes:  Pt resting in her room    [employer]

## 2021-12-03 NOTE — BH NOTES
Pt. is in a better mood and less depressed. She is meal, groups and medication compliant. She voiced out no complaint. Will continue to monitor for safety and provide support as needed.

## 2021-12-04 PROCEDURE — 99233 SBSQ HOSP IP/OBS HIGH 50: CPT | Performed by: PSYCHIATRY & NEUROLOGY

## 2021-12-04 PROCEDURE — 74011250637 HC RX REV CODE- 250/637: Performed by: PSYCHIATRY & NEUROLOGY

## 2021-12-04 PROCEDURE — 65220000003 HC RM SEMIPRIVATE PSYCH

## 2021-12-04 RX ORDER — GABAPENTIN 400 MG/1
400 CAPSULE ORAL 2 TIMES DAILY
Status: DISCONTINUED | OUTPATIENT
Start: 2021-12-04 | End: 2021-12-06 | Stop reason: HOSPADM

## 2021-12-04 RX ADMIN — AMLODIPINE BESYLATE 5 MG: 5 TABLET ORAL at 09:07

## 2021-12-04 RX ADMIN — HYDROXYZINE PAMOATE 50 MG: 50 CAPSULE ORAL at 20:17

## 2021-12-04 RX ADMIN — HYDROXYZINE PAMOATE 50 MG: 50 CAPSULE ORAL at 10:39

## 2021-12-04 RX ADMIN — IBUPROFEN 400 MG: 400 TABLET ORAL at 09:07

## 2021-12-04 RX ADMIN — CITALOPRAM HYDROBROMIDE 20 MG: 20 TABLET ORAL at 09:07

## 2021-12-04 RX ADMIN — GABAPENTIN 400 MG: 400 CAPSULE ORAL at 20:16

## 2021-12-04 RX ADMIN — GABAPENTIN 400 MG: 400 CAPSULE ORAL at 11:41

## 2021-12-04 RX ADMIN — IBUPROFEN 400 MG: 400 TABLET ORAL at 20:15

## 2021-12-04 RX ADMIN — LISINOPRIL 20 MG: 20 TABLET ORAL at 09:07

## 2021-12-04 RX ADMIN — Medication: at 20:20

## 2021-12-04 NOTE — GROUP NOTE
KARLENE  GROUP DOCUMENTATION INDIVIDUAL                                                                          Group Therapy Note    Date: 12/3/2021    Group Start Time: 2000  Group End Time: 2030  Group Topic: Medication    [facility] 1 ADULT CHEM DEP    Meño Fierro, SHERLYN    IP [address] [facility] GROUP DOCUMENTATION GROUP    Group Therapy Note    Attendees: 5         Attendance: Attended    Patient's Goal:  Understanding the importance of medication compliance. Interventions/techniques: Informed and Validated    Follows Directions: Followed directions    Interactions: Interacted appropriately    Mental Status: Calm    Behavior/appearance: Cooperative    Goals Achieved:  Identified feelings        Ambrocio Becerra RN

## 2021-12-04 NOTE — PROGRESS NOTES
[address] [facility] [address]  Inpatient Progress Note     Date of Service: 12/04/21  Hospital Day: 7     Subjective/Interval History   12/04/21    Treatment Team Notes:  Notes reviewed and/or discussed and report that Mauricio Wilhelm is a patient with a history of depression, multifactorial but mostly associated to her current difficulties with homelessness. Patient interview: Mauricio Wilhelm was interviewed by this writer today. Patient was seen in her room and reported improvement in depression and regression in SI. She mentioned that Trazodone is too much for her and requested to be started on Neurontin which she reportedly takes at home. We discussed and she verbalized understanding with the plan to discontinue Trazodone and start Neurontin and continue other medications unchanged. Objective     Visit Vitals  BP (!) 162/96 (BP 1 Location: Left upper arm, BP Patient Position: At rest)   Pulse 90   Temp 97 °F (36.1 °C)   Resp 20   Ht 5' 3.6\" (1.615 m)   Wt (!) 175.5 kg (387 lb)   SpO2 97%   Breastfeeding No   BMI 67.27 kg/m²       No results found for this or any previous visit (from the past 24 hour(s)). Mental Status Examination     Appearance/Hygiene 42 y.o.  BLACK/ female  Hygiene: Reasonable grooming   Behavior/Social Relatedness Appropriate   Musculoskeletal Gait/Station: appropriate  Tone (flaccid, cogwheeling, spastic): not assessed  Psychomotor (hyperkinetic, hypokinetic): calm   Involuntary movements (tics, dyskinesias, akathisa, stereotypies): none   Speech   Rate, rhythm, volume, fluency and articulation are appropriate   Mood   Depressed but getting better   Affect    Improving range   Thought Process Linear and goal directed   Thought Content and Perceptual Disturbances Denies self-injurious behavior (SIB), suicidal ideation (SI), aggressive behavior or homicidal ideation (HI)    Denies auditory and visual hallucinations   Sensorium and Cognition  Grossly intact   Insight  Improving   Judgment Improving        Assessment/Plan      Psychiatric Diagnoses:   Patient Active Problem List   Diagnosis Code    Back pain M54.9    Left knee pain M25.562    Major depressive disorder, recurrent episode, severe with anxious distress ([facility]) F33.2    Class 3 severe obesity in adult ([facility] [address] E66.01    Primary hypertension I10       Psychosocial and contextual factors: Unchanged    Level of impairment/disability: Moderate    oRberto Ramirez is a 42 y.o. who is currently Managed for depression with SI.      1.  Discontinue Trazodone, start Neurontin as per patient request, continue other medications unchanged.   2.  Disposition/Discharge Date: OSMEL Dougherty Ala, MD [facility]  Psychiatry

## 2021-12-04 NOTE — PROGRESS NOTES
Problem: Suicide  Goal: *STG: Remains safe in hospital  Description: AEB remaining safe and free from harm daily while hospitalized. Outcome: Progressing Towards Goal     Problem: Suicide  Goal: *STG/LTG: Complies with medication therapy  Description: AEB taking all medication as prescribed daily while hospitalized. Outcome: Progressing Towards Goal   Patient has been in room for majority of the morning and has been compliant with prescribed medications.

## 2021-12-04 NOTE — GROUP NOTE
KARLENE  GROUP DOCUMENTATION INDIVIDUAL                                                                          Group Therapy Note    Date: 12/4/2021    Group Start Time: 1130  Group End Time: 0531  Group Topic: Reflection/Relaxation    [facility] 1 ADULT CHEM [address], RN    IP [address] [facility] GROUP DOCUMENTATION GROUP    Group Therapy Note    Attendees: 4         Attendance: Attended      Interventions/techniques: Informed and Supported    Follows Directions:  Followed directions    Interactions: Interacted appropriately    Mental Status: Depressed and Preoccupied    Behavior/appearance: Cooperative    Goals Achieved: Able to self-disclose      Debra Lovell RN

## 2021-12-04 NOTE — PROGRESS NOTES
Problem: Suicide  Goal: *STG: Remains safe in hospital  Description: AEB remaining safe and free from harm daily while hospitalized. Outcome: Progressing Towards Goal  Goal: *STG/LTG: Complies with medication therapy  Description: AEB taking all medication as prescribed daily while hospitalized. Outcome: Progressing Towards Goal     Problem: Suicide  Goal: *STG: Attends activities and groups  Description: AEB attending at least 3-4 groups/activities daily while hospitalized. Outcome: Not Progressing Towards Goal     Tali Vasquez is meal and med compliant. She is free from falls and harm. She has not participated in minimum amount of groups and is withdrawn to room. Will continue to provide support as needed.

## 2021-12-05 LAB
ALBUMIN SERPL-MCNC: 3.4 G/DL (ref 3.4–5)
ALBUMIN/GLOB SERPL: 1 {RATIO} (ref 0.8–1.7)
ALP SERPL-CCNC: 73 U/L (ref 45–117)
ALT SERPL-CCNC: 61 U/L (ref 13–56)
AST SERPL-CCNC: 52 U/L (ref 10–38)
BILIRUB DIRECT SERPL-MCNC: <0.1 MG/DL (ref 0–0.2)
BILIRUB SERPL-MCNC: 0.3 MG/DL (ref 0.2–1)
GLOBULIN SER CALC-MCNC: 3.3 G/DL (ref 2–4)
PROT SERPL-MCNC: 6.7 G/DL (ref 6.4–8.2)

## 2021-12-05 PROCEDURE — 99233 SBSQ HOSP IP/OBS HIGH 50: CPT | Performed by: PSYCHIATRY & NEUROLOGY

## 2021-12-05 PROCEDURE — 36415 COLL VENOUS BLD VENIPUNCTURE: CPT

## 2021-12-05 PROCEDURE — 74011250637 HC RX REV CODE- 250/637: Performed by: PSYCHIATRY & NEUROLOGY

## 2021-12-05 PROCEDURE — 65220000003 HC RM SEMIPRIVATE PSYCH

## 2021-12-05 PROCEDURE — 80076 HEPATIC FUNCTION PANEL: CPT

## 2021-12-05 RX ADMIN — CITALOPRAM HYDROBROMIDE 20 MG: 20 TABLET ORAL at 08:47

## 2021-12-05 RX ADMIN — HYDROXYZINE PAMOATE 50 MG: 50 CAPSULE ORAL at 08:49

## 2021-12-05 RX ADMIN — IBUPROFEN 400 MG: 400 TABLET ORAL at 20:29

## 2021-12-05 RX ADMIN — HYDROXYZINE PAMOATE 50 MG: 50 CAPSULE ORAL at 20:29

## 2021-12-05 RX ADMIN — GABAPENTIN 400 MG: 400 CAPSULE ORAL at 20:29

## 2021-12-05 RX ADMIN — IBUPROFEN 400 MG: 400 TABLET ORAL at 08:49

## 2021-12-05 RX ADMIN — GABAPENTIN 400 MG: 400 CAPSULE ORAL at 08:47

## 2021-12-05 RX ADMIN — AMLODIPINE BESYLATE 5 MG: 5 TABLET ORAL at 08:47

## 2021-12-05 RX ADMIN — LISINOPRIL 20 MG: 20 TABLET ORAL at 08:47

## 2021-12-05 NOTE — PROGRESS NOTES
Problem: Falls - Risk of  Goal: *Absence of Falls  Description: Document Arina Camara Fall Risk and appropriate interventions in the flowsheet. Patient denied hx of falls, daily will remain fall free while in the hospital.  Outcome: Progressing Towards Goal  Note: Fall Risk Interventions:  Pt remains free of falls. Problem: Suicide  Goal: *STG: Remains safe in hospital  Description: AEB remaining safe and free from harm daily while hospitalized. Outcome: Progressing Towards Goal  Note: Pt remains free of self harm. Goal: *STG/LTG: Complies with medication therapy  Description: AEB taking all medication as prescribed daily while hospitalized. Outcome: Progressing Towards Goal  Note: Pt takes medications as ordered. Patient came out to the milieu for breakfast and to take her medications and has otherwise been in her room. Patient denies active SI but reports ongoing depression. Patient declines talking about her concerns and just states that she is in a situation that has left her feeling down. Patient is pleasant and cooperative but remains mainly isolative.

## 2021-12-05 NOTE — BH NOTES
Pt. is depressed affect dull, isolative and does not talk much. She is meal and medication compliant. She still has some SI but without a plan, denies HI or A/V/H. Will continue to monitor for safety and provide support as needed.

## 2021-12-05 NOTE — PROGRESS NOTES
[address]  Inpatient Progress Note     Date of Service: 12/05/21  Hospital Day: 8     Subjective/Interval History   12/05/21    Treatment Team Notes:  Notes reviewed and/or discussed and report that Taniya Slaughter is a patient with a history of depression, multifactorial but mostly associated to her current difficulties with homelessness. Patient interview: Taniya Slaughter was interviewed by this writer today. Patient still reports depression and passive SI but feels safe on the unit. Objective     Visit Vitals  BP (!) 169/94 (BP 1 Location: Left upper arm, BP Patient Position: At rest)   Pulse 94   Temp 98.5 °F (36.9 °C)   Resp 20   Ht 5' 3.6\" (1.615 m)   Wt (!) 175.5 kg (387 lb)   SpO2 97%   Breastfeeding No   BMI 67.27 kg/m²       Recent Results (from the past 24 hour(s))   HEPATIC FUNCTION PANEL    Collection Time: 12/05/21  6:35 AM   Result Value Ref Range    Protein, total 6.7 6.4 - 8.2 g/dL    Albumin 3.4 3.4 - 5.0 g/dL    Globulin 3.3 2.0 - 4.0 g/dL    A-G Ratio 1.0 0.8 - 1.7      Bilirubin, total 0.3 0.2 - 1.0 MG/DL    Bilirubin, direct <0.1 0.0 - 0.2 MG/DL    Alk. phosphatase 73 45 - 117 U/L    AST (SGOT) 52 (H) 10 - 38 U/L    ALT (SGPT) 61 (H) 13 - 56 U/L       Mental Status Examination     Appearance/Hygiene 42 y.o.  BLACK/ female  Hygiene: Reasonable grooming   Behavior/Social Relatedness Appropriate   Musculoskeletal Gait/Station: appropriate  Tone (flaccid, cogwheeling, spastic): not assessed  Psychomotor (hyperkinetic, hypokinetic): calm   Involuntary movements (tics, dyskinesias, akathisa, stereotypies): none   Speech   Rate, rhythm, volume, fluency and articulation are appropriate   Mood   Still depressed   Affect    Improving range   Thought Process Linear and goal directed   Thought Content and Perceptual Disturbances Denies self-injurious behavior (SIB), suicidal ideation (SI), aggressive behavior or homicidal ideation (HI)    Denies auditory and visual hallucinations   Sensorium and Cognition  Grossly intact   Insight  Improving   Judgment Improving        Assessment/Plan      Psychiatric Diagnoses:   Patient Active Problem List   Diagnosis Code    Back pain M54.9    Left knee pain M25.562    Major depressive disorder, recurrent episode, severe with anxious distress ([facility]) F33.2    Class 3 severe obesity in adult ([facility] [address] E66.01    Primary hypertension I10       Psychosocial and contextual factors: Unchanged    Level of impairment/disability: Moderate    Adolm Wilber is a 42 y.o. who is currently managed for depression with SI.      1.  Continue current medication regimen unchanged given mild improvement in symptoms without side effects.   2.  Disposition/Discharge Date: OSMEL Hope MD DR. [facility]  Psychiatry

## 2021-12-06 VITALS
HEIGHT: 64 IN | HEART RATE: 93 BPM | DIASTOLIC BLOOD PRESSURE: 93 MMHG | OXYGEN SATURATION: 97 % | TEMPERATURE: 98.8 F | BODY MASS INDEX: 50.02 KG/M2 | WEIGHT: 293 LBS | SYSTOLIC BLOOD PRESSURE: 167 MMHG | RESPIRATION RATE: 17 BRPM

## 2021-12-06 PROCEDURE — 74011250637 HC RX REV CODE- 250/637: Performed by: PSYCHIATRY & NEUROLOGY

## 2021-12-06 PROCEDURE — 99238 HOSP IP/OBS DSCHRG MGMT 30/<: CPT | Performed by: PSYCHIATRY & NEUROLOGY

## 2021-12-06 RX ORDER — CITALOPRAM 20 MG/1
20 TABLET, FILM COATED ORAL DAILY
Qty: 15 TABLET | Refills: 1 | Status: SHIPPED | OUTPATIENT
Start: 2021-12-07

## 2021-12-06 RX ORDER — AMLODIPINE BESYLATE 10 MG/1
10 TABLET ORAL DAILY
Qty: 15 TABLET | Refills: 1 | Status: SHIPPED | OUTPATIENT
Start: 2021-12-07

## 2021-12-06 RX ORDER — LISINOPRIL 20 MG/1
20 TABLET ORAL DAILY
Qty: 15 TABLET | Refills: 1 | Status: SHIPPED | OUTPATIENT
Start: 2021-12-07

## 2021-12-06 RX ORDER — AMLODIPINE BESYLATE 10 MG/1
10 TABLET ORAL DAILY
Status: DISCONTINUED | OUTPATIENT
Start: 2021-12-07 | End: 2021-12-06 | Stop reason: HOSPADM

## 2021-12-06 RX ORDER — HYDROXYZINE PAMOATE 50 MG/1
50 CAPSULE ORAL
Qty: 30 CAPSULE | Refills: 1 | Status: SHIPPED | OUTPATIENT
Start: 2021-12-06

## 2021-12-06 RX ORDER — GABAPENTIN 400 MG/1
400 CAPSULE ORAL 2 TIMES DAILY
Qty: 30 CAPSULE | Refills: 1 | Status: SHIPPED | OUTPATIENT
Start: 2021-12-06

## 2021-12-06 RX ADMIN — LISINOPRIL 20 MG: 20 TABLET ORAL at 08:12

## 2021-12-06 RX ADMIN — CITALOPRAM HYDROBROMIDE 20 MG: 20 TABLET ORAL at 08:12

## 2021-12-06 RX ADMIN — AMLODIPINE BESYLATE 5 MG: 5 TABLET ORAL at 08:12

## 2021-12-06 RX ADMIN — GABAPENTIN 400 MG: 400 CAPSULE ORAL at 08:12

## 2021-12-06 NOTE — PROGRESS NOTES
Problem: Falls - Risk of  Goal: *Absence of Falls  Description: Document [place] [facility] Fall Risk and appropriate interventions in the flowsheet. Patient denied hx of falls, daily will remain fall free while in the hospital.  Outcome: Progressing Towards Goal  Note: Fall Risk Interventions:                                Problem: Suicide  Goal: *STG: Remains safe in hospital  Description: AEB remaining safe and free from harm daily while hospitalized. Outcome: Progressing Towards Goal  Goal: *STG/LTG: Complies with medication therapy  Description: AEB taking all medication as prescribed daily while hospitalized. Outcome: Progressing Towards Goal     Pt presents with bright affect, euthymic mood. Pt has been social on the unit. Pt is participative in groups and is adherent with unit guidelines. Pt denies SI/HI at this time. Pt is medication compliant. Will continue to monitor.

## 2021-12-06 NOTE — DISCHARGE INSTRUCTIONS
BEHAVIORAL HEALTH NURSING DISCHARGE NOTE      The following personal items collected during your admission are returned to you:   Dental Appliance:    Vision: Visual Aid: With patient, Glasses  Hearing Aid:    Jewelry:    Clothing: Clothing:  (Massiel Sale, Sneakers)  Other Valuables: Other Valuables:  (Back Pack,  Phone )  Valuables sent to safe:        PATIENT INSTRUCTIONS:      Pt received discharge instructions, prescriptions, and emergency numbers. Pt completed satisfaction survey. All personal belongings returned to pt. Pt encouraged to follow-up with outpatient resources and to call with any questions or concerns. The discharge information has been reviewed with the patient. The patient verbalized understanding.

## 2021-12-06 NOTE — BSMART NOTE
SOCIAL WORK GROUP THERAPY PROGRESS NOTE    Group Time:  10:15AM    Group Topic:  Coping Skills    Group Participation:     Pt was unavailable for group due to PREPARING FOR D/C WITH DR & NURSING STAFF.

## 2021-12-06 NOTE — BSMART NOTE
SW Contact:      Pt is a 71-year-old female, single, admitted from [facility] Emergency Department with a history of being increasingly depressed and suicidal. Helpless, hopeless, anhedonic, & now homeless with almost no support system.      Today: pt bit more optimistic feeling that family a little more supportive.     CLINICAL IMPRESSION:  AXIS I:  Major depressive disorder, recurrent with anxious distress without psychotic symptoms. AXIS II:  Noncontributory. AXIS III:  Hypertension by history; morbidly obese; history of chronic lumbosacral spine pain; bilateral knee pain; history of midsternal chest pain, remote; history of adverse effects to sulfate salts, which produce headaches; and penicillins, unknown reaction.      Intervention: pt has maintained contact with [facility] & assessment still needs to be completed. We processed goals she's met from tx team. We reviewed d/c and safety plan to include: Outpt will be as INITIAL INTAKE with [facility] [address] [facility] [address]                                                               # [phone]   MUST TO WALK IN ON . .. Monday to Thursday  from 8:30am to 2:30pm    [address] [facility] #[phone]   Pt is waiting to complete assessment. . she will call them to let them know she's d/c today. Housing: pt will stay about x1wk with mom as she complies with the above     Transport: WILL NEED BUS PASS. Dr & tx team updated.

## 2021-12-06 NOTE — BH NOTES
Pt received discharge instructions, prescriptions, and emergency numbers. Pt completed satisfaction survey. Pt completed suicide safety plan with staff. All personal belongings returned to pt. Pt encouraged to follow-up with outpatient resources and to call with any questions or concerns. Outpt will be as INITIAL INTAKE with [address] [employer] [facility]. Woody Tesfaye  [facility] #[phone]

## 2021-12-06 NOTE — BH NOTES
Pt slept for 3hrs this period after staying up late reading a book quietly in her room up until 3am.

## 2021-12-06 NOTE — GROUP NOTE
[facility] GROUP DOCUMENTATION INDIVIDUAL                                                                          Group Therapy Note    Date: 12/5/2021    Group Start Time: 2000  Group End Time: 2030  Group Topic: Medication    [facility] ADULT CHEM DEP    Gorge Sahu RN    [facility] GROUP DOCUMENTATION GROUP    Group Therapy Note    Attendees: 5         Attendance: Attended    Patient's Goal:  Understanding the importance of medication compliance. Interventions/techniques: Informed and Validated    Follows Directions: Followed directions    Interactions: Interacted appropriately    Mental Status: Calm    Behavior/appearance: Cooperative    Goals Achieved:  Identified feelings    Abdoulaye Olivera RN

## 2021-12-07 ENCOUNTER — TELEPHONE (OUTPATIENT)
Dept: ADDICTION MEDICINE | Age: 41
End: 2021-12-07

## 2021-12-07 NOTE — TELEPHONE ENCOUNTER
This writer completed follow-up call at [phone] to phone number [phone]. Patient states that she is doing okay. Has been on the phone all day trying to get housing plans straighted out. Patient states she did not get her prescription filled today, but plans to get it filled tomorrow. Confirms plans to follow up with [facility] and [facility].

## 2021-12-19 NOTE — DISCHARGE SUMMARY
[address]  DISCHARGE    Name:  Royal Nuno  MR#:   [id number]  :  1980  ACCOUNT #:  [de-identified]  ADMIT DATE:  2021  DISCHARGE DATE:  2021      SIGNIFICANT FINDINGS:  History and physical exam was performed shortly after the patient was admitted to the facility, attention invited to this document, a place where the reasons for which the patient presented herself to [facility], the findings upon her being examined there and so the reason for which she required psychiatric admission are very clearly stated. For the purpose of this discharge summary, the reader is advised that when the patient presented herself to the ER, she described a history of being increasingly depressed, this being in association to her being homeless for several days, with a plan of jumping into [place] and to drown since Brittaney didn't know how to swim. \"  The patient described her depression being chronic with recurrences, becoming more intense during 3 or 4 days since the patient's sister, with whom she was staying at a local motel, basically told her to leave the premises. Helpless, hopeless, anhedonic, the patient described being overwhelmed by her situation and becoming increasingly suicidal.  It was then when she decided to consult with the ER and when upon being medically cleared with the case being presented to the physician on-call, the patient was admitted to the service of the undersigned. The patient's psychiatric history was remarkable for history of depression with recurrences. There was a time when she used to see members of [facility] for medical care and was prescribed then with Lexapro up to 20 mg a day. The patient described improvement with her depression by basically feeling \"numb. \"  However, it appeared that she was not able to relate to her lack of emotions to the point in which Lexapro was reduced to only 5 mg a day which obviously being supratherapeutic did not work. The patient mentioned that she had never been hospitalized psychiatrically before. 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 It appeared that the patient's last client brought to her services  of COVID-23. The patient has been without a job she said for a year. She was not able to get her job back, the reason for that not clear especially with the pandemic and many people requiring home health care. Regardless, she was staying with her sister in one of the local motels. She could always been able to take care of others including her sister for which she provided shelter and economical support in the past, this being the first time in which her sister was helping her out. However, her help did not last any time and as soon as the patient was not able to provide some help with the payment of her stay in the motel, she was told to get out as I had above mentioned. The patient's medical history remarkable for morbid obesity. Her BMI was 67.27 kilograms per square meter when she came in. She has had problems with chronic back pain, not helped by her severe weight. Bilateral knee pain also a problem. She had a prior history of hypertension and mild sternal chest pain and again her obesity. SHE DESCRIBED BEING ALLERGIC TO PENICILLINS, UNKNOWN REACTION AND HAD A HISTORY OF PROBLEMS WITH SULFATE SALTS, WHICH HAD CAUSED SEVERE HEADACHES. Physical exam in the emergency room showed the patient with a blood pressure 194/99, heart rate 113, temperature of 36.2. Her weight 387 pounds with above-mentioned BMI. Oxygen saturation rate 98%. Some problems with shortness of breath had been described possibly associated to her obesity. Otherwise, physical exam was negative with exception of her overall weight and the presence of depression with suicidal plan.     Multiple labs were performed in the emergency room including a CBC with differential that showed what appeared to have been a chronic anemia. Hemoglobin 11.3, RBC 3.89. Rest of the CBC basically normal.  Urinalysis negative. Comprehensive metabolic panel showed normal electrolytes, blood sugar elevated to 124, BUN 15, creatinine 0.94, estimated GFR above 60 mL/minute. Liver function tests showed elevated ALT to 72 and AST to 56 with a normal alkaline phosphatase. Alcohol levels were 8 mg/dL. Urine drug screen was positive for opiates. COVID rapid testing showed negative results. EKG showed a sinus rhythm with a heart rate of 98 beats per minute,  and QTc 418. Nonspecific T-wave abnormalities described. COURSE DURING HOSPITALIZATION AND TREATMENT:  Admitted to the adult CD program, the patient was seen on daily individual psychiatric basis and was also referred to the groups within context of the program.  Rather depressed upon admission, the patient was also requested to be seen by one of our nutritionist with attention being invited to Ms. Marce Medrano, nutritional consult, which is detailed and self-explanatory. Concerns raised by the undersigned about the patient's obesity and wasting, which her obesity was interfering with her overall medical health and also provided the patient with a rather low self-esteem and also difficulties with multiple physical problems as stated. During the patient's hospital stay, she was noted to be hypertensive. Treatment with citalopram was started for depression up to 20 mg a day with excellent progress being noted, the same as she was provided treatment with Norvasc 10 mg a day for her blood pressure and for chronic neuropathic pain treatment with gabapentin also provided. The patient had a history of dry skin for which she required treatment with lanolin alcohol ointment and in addition to her requiring treatment with lisinopril 20 mg a day in addition to get the above-mentioned amlodipine to help with her hypertension. The patient tolerated these medications very well and showed positive improvement in regards to her symptoms of depression  By the time in which the patient was discharged to outpatient treatment, a positive improvement had noted and specifically she was then found not to be self harmful or harmful to others. No evidence of medications-related side effects also noted upon discharge. A physical exam in the unit by Zhanna Beauchamp, physician assistant, confirmed the findings upon the patient's examination in the emergency room. Multiple labs were performed since the patient's admission including a lipid panel that showed elevated triglycerides to 155, total cholesterol 195, HDL low at 37, cholesterol-HDL ratio of 5.3 and LDL of 127. A1c performed showed to be elevated to 6.1% and so the patient was considered to be a prediabetic. Due to anemia, an iron level was performed showing to be 58, TIBC 335 and iron percent saturation low at 17%. For completeness, a TSH was performed which was normal at 2.65 International Units per milliliter. Due to the patient's elevated liver function tests, we proceeded to obtain a hep C virus antibody which was negative at below 0.02 and also a liver ultrasound which came back abnormal showing the presence of a steatosis with notable amount of cholelithiasis with shadowing limiting evaluation, but no specific evidence of cholecystitis. The increased hepatic echotexture suggesting a steatosis with no other significant findings described during the liver ultrasound. The patient by the way was informed about all of these test results. CONDITION UPON DISCHARGE:  Not suicidal or homicidal, not psychotic nor organic and psychiatrically competent. FINAL DIAGNOSES:  AXIS I:  Major depressive disorder, recurrent, without psychotic symptoms. AXIS II:  Noncontributory. AXIS III:  Hypertension, under treatment. Morbid obesity. Pre-diabetes. Chronic lumbosacral spine pain. Bilateral knee pain. History of midsternal chest pain, remote. Liver steatosis by liver ultrasound, evidence of cholelithiasis without evidence of cholecystitis by ultrasound results also. History of adverse effects versus allergies to sulfate salts producing headaches and penicillins, unknown reaction. DISPOSITION:  Attention was invited to the patient's discharge note from her inpatient  which is self-explanatory. She was referred for further outpatient treatment as indicated. Initial intake with [facility] was strongly suggested. The patient was waiting for a complete assessment through [place] with the patient to call them after discharge from the facility to let them know she was leaving the hospital.  For housing, she was going to stay with her mother for a week or so. She was hopeful that [facility] was going to come through that time to provide her with a place where to stay. PRESCRIPTIONS UPON DISCHARGE:  15 days prescriptions with one refill and following medications provided including Celexa 20 mg every day, Norvasc 10 mg every day, lisinopril 20 mg every day, gabapentin 400 mg twice a day, Vistaril 50 mg three times a day as needed #30 capsules also with one refill. Eucerin for the patient's eczema, also provided 113 g tube with one refill. No evidence of medications-related side effects noted upon discharge. PROGNOSIS:  Fair to guarded depending upon the patient's treatment compliance.     Andrew Gann MD, LFAPA      [id number]  D:  12/18/2021 19:32  T:  12/19/2021 6:19  JOB #:  [id number]

## 2022-03-19 PROBLEM — E66.813 CLASS 3 SEVERE OBESITY IN ADULT: Status: ACTIVE | Noted: 2021-11-28

## 2022-03-19 PROBLEM — E66.01 CLASS 3 SEVERE OBESITY IN ADULT (HCC): Status: ACTIVE | Noted: 2021-11-28

## 2022-03-19 PROBLEM — I10 PRIMARY HYPERTENSION: Status: ACTIVE | Noted: 2021-12-02

## 2022-03-20 PROBLEM — F33.2 MAJOR DEPRESSIVE DISORDER, RECURRENT EPISODE, SEVERE WITH ANXIOUS DISTRESS (HCC): Status: ACTIVE | Noted: 2021-11-28

## 2022-10-12 ENCOUNTER — HOSPITAL ENCOUNTER (EMERGENCY)
Facility: HOSPITAL | Age: 42
Discharge: HOME | End: 2022-10-13
Attending: EMERGENCY MEDICINE
Payer: MEDICAID

## 2022-10-12 VITALS
WEIGHT: 142.19 LBS | DIASTOLIC BLOOD PRESSURE: 77 MMHG | OXYGEN SATURATION: 98 % | TEMPERATURE: 98 F | RESPIRATION RATE: 18 BRPM | BODY MASS INDEX: 22.95 KG/M2 | SYSTOLIC BLOOD PRESSURE: 139 MMHG | HEART RATE: 95 BPM

## 2022-10-12 DIAGNOSIS — H10.32 ACUTE CONJUNCTIVITIS OF LEFT EYE, UNSPECIFIED ACUTE CONJUNCTIVITIS TYPE: Primary | ICD-10-CM

## 2022-10-12 DIAGNOSIS — J06.9 VIRAL URI: ICD-10-CM

## 2022-10-12 PROCEDURE — 87502 INFLUENZA DNA AMP PROBE: CPT | Performed by: EMERGENCY MEDICINE

## 2022-10-12 PROCEDURE — 99284 EMERGENCY DEPT VISIT MOD MDM: CPT | Mod: 25

## 2022-10-12 RX ORDER — DEXAMETHASONE SODIUM PHOSPHATE 4 MG/ML
8 INJECTION, SOLUTION INTRA-ARTICULAR; INTRALESIONAL; INTRAMUSCULAR; INTRAVENOUS; SOFT TISSUE
Status: COMPLETED | OUTPATIENT
Start: 2022-10-13 | End: 2022-10-12

## 2022-10-12 RX ADMIN — DEXAMETHASONE SODIUM PHOSPHATE 8 MG: 4 INJECTION, SOLUTION INTRA-ARTICULAR; INTRALESIONAL; INTRAMUSCULAR; INTRAVENOUS; SOFT TISSUE at 11:10

## 2022-10-13 LAB
INFLUENZA A, MOLECULAR: NEGATIVE
INFLUENZA B, MOLECULAR: NEGATIVE
SPECIMEN SOURCE: NORMAL

## 2022-10-13 PROCEDURE — 96372 THER/PROPH/DIAG INJ SC/IM: CPT | Performed by: EMERGENCY MEDICINE

## 2022-10-13 PROCEDURE — 63600175 PHARM REV CODE 636 W HCPCS: Performed by: EMERGENCY MEDICINE

## 2022-10-13 RX ORDER — ERYTHROMYCIN 5 MG/G
OINTMENT OPHTHALMIC
Qty: 3.5 G | Refills: 0 | Status: SHIPPED | OUTPATIENT
Start: 2022-10-13

## 2022-10-13 RX ORDER — ERYTHROMYCIN 5 MG/G
OINTMENT OPHTHALMIC
Qty: 3.5 G | Refills: 0 | Status: SHIPPED | OUTPATIENT
Start: 2022-10-13 | End: 2022-10-13 | Stop reason: SDUPTHER

## 2022-10-13 NOTE — ED PROVIDER NOTES
Encounter Date: 10/12/2022       History     Chief Complaint   Patient presents with    Conjunctivitis    URI     Pt c/o redness in left eye and Upper Respiratory Infection symptoms since Tuesday morning.  Redness noted to let eye.  Pt reports drainage, pain and itching to left eye.  [place] nose with irritation and sore throat with body aches.  Pt denies N/V/D and fever.       43 yo female here via POV with complaint of left eye irritation with clear discharge x 2 days. Associated with sore throat and congestion with post nasal drip. Onset gradually. No known sick contacts. No dyspnea. No fever or chills. No N/V/D.     Review of patient's allergies indicates:  No Known Allergies  No past medical history on file.  Past Surgical History:   Procedure Laterality Date    TUBAL LIGATION       No family history on file.     Review of Systems   Constitutional:  Positive for fatigue.   HENT:  Positive for congestion.    Eyes:  Positive for discharge and redness.   Respiratory: Negative.     Cardiovascular: Negative.    Gastrointestinal: Negative.    All other systems reviewed and are negative.    Physical Exam     Initial Vitals [10/12/22 2343]   BP Pulse Resp Temp SpO2   139/77 95 18 97.6 °F (36.4 °C) 98 %      MAP       --         Physical Exam    Nursing note and vitals reviewed.  Constitutional: She is not diaphoretic. No distress.   HENT:   Head: Normocephalic and atraumatic.   Eyes: EOM are normal. Pupils are equal, round, and reactive to light. Right eye exhibits no discharge. Left eye exhibits discharge.   Left conjunctiva injected   Neck: Neck supple.   Normal range of motion.  Cardiovascular:  Normal rate, regular rhythm and intact distal pulses.           Pulmonary/Chest: Breath sounds normal. No respiratory distress. She has no wheezes. She has no rales.   Abdominal: Abdomen is soft. Bowel sounds are normal. She exhibits no distension. There is no abdominal tenderness. There is no rebound.   Musculoskeletal:          General: No tenderness or edema. Normal range of motion.      Cervical back: Normal range of motion and neck supple.     Neurological: She is alert and oriented to person, place, and time. She has normal strength and normal reflexes. GCS score is 15. GCS eye subscore is 4. GCS verbal subscore is 5. GCS motor subscore is 6.   Skin: Skin is warm and dry. Capillary refill takes less than 2 seconds. No rash noted.       ED Course   Procedures  Labs Reviewed   INFLUENZA A & B BY MOLECULAR          Imaging Results    None          Medications   dexAMETHasone injection 8 mg (8 mg Intramuscular Given 10/12/22 0689)     Medical Decision Making:   Clinical Tests:   Lab Tests: Reviewed and Ordered                        Clinical Impression:   Final diagnoses:  [H10.32] Acute conjunctivitis of left eye, unspecified acute conjunctivitis type (Primary)  [J06.9] Viral URI        ED Disposition Condition    Discharge Stable          ED Prescriptions       Medication Sig Dispense Start Date End Date Auth. Provider    erythromycin (ROMYCIN) ophthalmic ointment Place a 1/2 inch ribbon of ointment into the lower eyelid of affected eye every 8 hours 3.5 g 10/13/2022 -- Gordo Callahan MD          Follow-up Information    None          Gordo Callahan MD  10/13/22 0044

## 2023-08-24 ENCOUNTER — HOSPITAL ENCOUNTER (INPATIENT)
Facility: HOSPITAL | Age: 43
LOS: 2 days | Discharge: HOME | DRG: 720 | End: 2023-08-26
Attending: EMERGENCY MEDICINE | Admitting: STUDENT IN AN ORGANIZED HEALTH CARE EDUCATION/TRAINING PROGRAM
Payer: COMMERCIAL

## 2023-08-24 ENCOUNTER — APPOINTMENT (OUTPATIENT)
Facility: HOSPITAL | Age: 43
DRG: 720 | End: 2023-08-24
Payer: COMMERCIAL

## 2023-08-24 DIAGNOSIS — L03.311 CELLULITIS, ABDOMINAL WALL: Primary | ICD-10-CM

## 2023-08-24 DIAGNOSIS — A41.9 SEPTICEMIA (HCC): ICD-10-CM

## 2023-08-24 DIAGNOSIS — E66.01 MORBID OBESITY WITH BMI OF 60.0-69.9, ADULT (HCC): ICD-10-CM

## 2023-08-24 LAB
ALBUMIN SERPL-MCNC: 2.9 G/DL (ref 3.4–5)
ALBUMIN/GLOB SERPL: 0.5 (ref 0.8–1.7)
ALP SERPL-CCNC: 162 U/L (ref 45–117)
ALT SERPL-CCNC: 70 U/L (ref 13–56)
ANION GAP SERPL CALC-SCNC: 4 MMOL/L (ref 3–18)
APPEARANCE UR: CLEAR
AST SERPL-CCNC: 45 U/L (ref 10–38)
BACTERIA URNS QL MICRO: NEGATIVE /HPF
BASOPHILS # BLD: 0 K/UL (ref 0–0.1)
BASOPHILS NFR BLD: 0 % (ref 0–2)
BILIRUB SERPL-MCNC: 0.5 MG/DL (ref 0.2–1)
BILIRUB UR QL: NEGATIVE
BUN SERPL-MCNC: 8 MG/DL (ref 7–18)
BUN/CREAT SERPL: 8 (ref 12–20)
CALCIUM SERPL-MCNC: 9.6 MG/DL (ref 8.5–10.1)
CHLORIDE SERPL-SCNC: 102 MMOL/L (ref 100–111)
CO2 SERPL-SCNC: 30 MMOL/L (ref 21–32)
COLOR UR: ABNORMAL
CREAT SERPL-MCNC: 0.95 MG/DL (ref 0.6–1.3)
DIFFERENTIAL METHOD BLD: ABNORMAL
EOSINOPHIL # BLD: 0 K/UL (ref 0–0.4)
EOSINOPHIL NFR BLD: 0 % (ref 0–5)
EPITH CASTS URNS QL MICRO: NORMAL /LPF (ref 0–5)
ERYTHROCYTE [DISTWIDTH] IN BLOOD BY AUTOMATED COUNT: 16.6 % (ref 11.6–14.5)
FLUAV RNA SPEC QL NAA+PROBE: NOT DETECTED
FLUBV RNA SPEC QL NAA+PROBE: NOT DETECTED
GLOBULIN SER CALC-MCNC: 5.4 G/DL (ref 2–4)
GLUCOSE SERPL-MCNC: 129 MG/DL (ref 74–99)
GLUCOSE UR STRIP.AUTO-MCNC: NEGATIVE MG/DL
HCT VFR BLD AUTO: 30.2 % (ref 35–45)
HGB BLD-MCNC: 9 G/DL (ref 12–16)
HGB UR QL STRIP: NEGATIVE
IMM GRANULOCYTES # BLD AUTO: 0.3 K/UL (ref 0–0.04)
IMM GRANULOCYTES NFR BLD AUTO: 2 % (ref 0–0.5)
KETONES UR QL STRIP.AUTO: 15 MG/DL
LACTATE SERPL-SCNC: 0.8 MMOL/L (ref 0.4–2)
LACTATE SERPL-SCNC: 2.2 MMOL/L (ref 0.4–2)
LEUKOCYTE ESTERASE UR QL STRIP.AUTO: NEGATIVE
LYMPHOCYTES # BLD: 1.4 K/UL (ref 0.9–3.6)
LYMPHOCYTES NFR BLD: 8 % (ref 21–52)
MCH RBC QN AUTO: 27.2 PG (ref 24–34)
MCHC RBC AUTO-ENTMCNC: 29.8 G/DL (ref 31–37)
MCV RBC AUTO: 91.2 FL (ref 78–100)
MONOCYTES # BLD: 0.5 K/UL (ref 0.05–1.2)
MONOCYTES NFR BLD: 3 % (ref 3–10)
NEUTS SEG # BLD: 15.1 K/UL (ref 1.8–8)
NEUTS SEG NFR BLD: 87 % (ref 40–73)
NITRITE UR QL STRIP.AUTO: NEGATIVE
NRBC # BLD: 0.05 K/UL (ref 0–0.01)
NRBC BLD-RTO: 0.3 PER 100 WBC
PH UR STRIP: 6.5 (ref 5–8)
PLATELET # BLD AUTO: 518 K/UL (ref 135–420)
PLATELET COMMENT: ABNORMAL
PMV BLD AUTO: 10.1 FL (ref 9.2–11.8)
POTASSIUM SERPL-SCNC: 3.7 MMOL/L (ref 3.5–5.5)
PROCALCITONIN SERPL-MCNC: 0.18 NG/ML
PROT SERPL-MCNC: 8.3 G/DL (ref 6.4–8.2)
PROT UR STRIP-MCNC: ABNORMAL MG/DL
RBC # BLD AUTO: 3.31 M/UL (ref 4.2–5.3)
RBC #/AREA URNS HPF: NEGATIVE /HPF (ref 0–5)
RBC MORPH BLD: ABNORMAL
RBC MORPH BLD: ABNORMAL
SARS-COV-2 RNA RESP QL NAA+PROBE: NOT DETECTED
SODIUM SERPL-SCNC: 136 MMOL/L (ref 136–145)
SP GR UR REFRACTOMETRY: 1.02 (ref 1–1.03)
TROPONIN I SERPL HS-MCNC: 3 NG/L (ref 0–54)
UROBILINOGEN UR QL STRIP.AUTO: 4 EU/DL (ref 0.2–1)
WBC # BLD AUTO: 17.3 K/UL (ref 4.6–13.2)
WBC URNS QL MICRO: NORMAL /HPF (ref 0–4)

## 2023-08-24 PROCEDURE — 6370000000 HC RX 637 (ALT 250 FOR IP)

## 2023-08-24 PROCEDURE — 96366 THER/PROPH/DIAG IV INF ADDON: CPT

## 2023-08-24 PROCEDURE — 71045 X-RAY EXAM CHEST 1 VIEW: CPT

## 2023-08-24 PROCEDURE — 6360000002 HC RX W HCPCS

## 2023-08-24 PROCEDURE — 93005 ELECTROCARDIOGRAM TRACING: CPT | Performed by: EMERGENCY MEDICINE

## 2023-08-24 PROCEDURE — 1100000000 HC RM PRIVATE

## 2023-08-24 PROCEDURE — 87636 SARSCOV2 & INF A&B AMP PRB: CPT

## 2023-08-24 PROCEDURE — 96367 TX/PROPH/DG ADDL SEQ IV INF: CPT

## 2023-08-24 PROCEDURE — 84484 ASSAY OF TROPONIN QUANT: CPT

## 2023-08-24 PROCEDURE — 87077 CULTURE AEROBIC IDENTIFY: CPT

## 2023-08-24 PROCEDURE — 84145 PROCALCITONIN (PCT): CPT

## 2023-08-24 PROCEDURE — 87075 CULTR BACTERIA EXCEPT BLOOD: CPT

## 2023-08-24 PROCEDURE — 87040 BLOOD CULTURE FOR BACTERIA: CPT

## 2023-08-24 PROCEDURE — 96365 THER/PROPH/DIAG IV INF INIT: CPT

## 2023-08-24 PROCEDURE — 99285 EMERGENCY DEPT VISIT HI MDM: CPT

## 2023-08-24 PROCEDURE — 80053 COMPREHEN METABOLIC PANEL: CPT

## 2023-08-24 PROCEDURE — 87186 SC STD MICRODIL/AGAR DIL: CPT

## 2023-08-24 PROCEDURE — 83605 ASSAY OF LACTIC ACID: CPT

## 2023-08-24 PROCEDURE — 85025 COMPLETE CBC W/AUTO DIFF WBC: CPT

## 2023-08-24 PROCEDURE — 87205 SMEAR GRAM STAIN: CPT

## 2023-08-24 PROCEDURE — 2580000003 HC RX 258

## 2023-08-24 PROCEDURE — 81001 URINALYSIS AUTO W/SCOPE: CPT

## 2023-08-24 PROCEDURE — 2580000003 HC RX 258: Performed by: EMERGENCY MEDICINE

## 2023-08-24 PROCEDURE — 6360000002 HC RX W HCPCS: Performed by: EMERGENCY MEDICINE

## 2023-08-24 PROCEDURE — 87070 CULTURE OTHR SPECIMN AEROBIC: CPT

## 2023-08-24 RX ORDER — HYDROXYZINE HYDROCHLORIDE 25 MG/1
25 TABLET, FILM COATED ORAL
Status: ACTIVE | OUTPATIENT
Start: 2023-08-24 | End: 2023-08-25

## 2023-08-24 RX ORDER — LANOLIN ALCOHOL/MO/W.PET/CERES
500 CREAM (GRAM) TOPICAL DAILY
COMMUNITY

## 2023-08-24 RX ORDER — FOLIC ACID 1 MG/1
1 TABLET ORAL DAILY
COMMUNITY

## 2023-08-24 RX ORDER — CITALOPRAM 20 MG/1
30 TABLET ORAL NIGHTLY
Status: DISCONTINUED | OUTPATIENT
Start: 2023-08-25 | End: 2023-08-26 | Stop reason: HOSPADM

## 2023-08-24 RX ORDER — LISINOPRIL 20 MG/1
20 TABLET ORAL NIGHTLY
Status: DISCONTINUED | OUTPATIENT
Start: 2023-08-24 | End: 2023-08-26 | Stop reason: HOSPADM

## 2023-08-24 RX ORDER — SODIUM CHLORIDE, SODIUM LACTATE, POTASSIUM CHLORIDE, AND CALCIUM CHLORIDE .6; .31; .03; .02 G/100ML; G/100ML; G/100ML; G/100ML
30 INJECTION, SOLUTION INTRAVENOUS ONCE
Status: COMPLETED | OUTPATIENT
Start: 2023-08-24 | End: 2023-08-24

## 2023-08-24 RX ORDER — ONDANSETRON 4 MG/1
4 TABLET, ORALLY DISINTEGRATING ORAL EVERY 8 HOURS PRN
Status: DISCONTINUED | OUTPATIENT
Start: 2023-08-24 | End: 2023-08-26 | Stop reason: HOSPADM

## 2023-08-24 RX ORDER — CITALOPRAM 20 MG/1
20 TABLET ORAL NIGHTLY
Status: DISCONTINUED | OUTPATIENT
Start: 2023-08-24 | End: 2023-08-24

## 2023-08-24 RX ORDER — ACETAMINOPHEN 325 MG/1
650 TABLET ORAL EVERY 6 HOURS PRN
Status: DISCONTINUED | OUTPATIENT
Start: 2023-08-24 | End: 2023-08-26 | Stop reason: HOSPADM

## 2023-08-24 RX ORDER — ACETAMINOPHEN 650 MG/1
650 SUPPOSITORY RECTAL EVERY 6 HOURS PRN
Status: DISCONTINUED | OUTPATIENT
Start: 2023-08-24 | End: 2023-08-26 | Stop reason: HOSPADM

## 2023-08-24 RX ORDER — FLUOCINONIDE 0.5 MG/G
OINTMENT TOPICAL 2 TIMES DAILY
Status: DISCONTINUED | OUTPATIENT
Start: 2023-08-24 | End: 2023-08-26 | Stop reason: HOSPADM

## 2023-08-24 RX ORDER — ACETAMINOPHEN 500 MG
500 TABLET ORAL EVERY 6 HOURS PRN
COMMUNITY

## 2023-08-24 RX ORDER — AMLODIPINE BESYLATE 10 MG/1
10 TABLET ORAL NIGHTLY
Status: DISCONTINUED | OUTPATIENT
Start: 2023-08-24 | End: 2023-08-26 | Stop reason: HOSPADM

## 2023-08-24 RX ORDER — GABAPENTIN 300 MG/1
600 CAPSULE ORAL 3 TIMES DAILY PRN
Status: DISCONTINUED | OUTPATIENT
Start: 2023-08-24 | End: 2023-08-26 | Stop reason: HOSPADM

## 2023-08-24 RX ORDER — TRAMADOL HYDROCHLORIDE 50 MG/1
50 TABLET ORAL EVERY 8 HOURS PRN
COMMUNITY

## 2023-08-24 RX ORDER — M-VIT,TX,IRON,MINS/CALC/FOLIC 27MG-0.4MG
1 TABLET ORAL DAILY
COMMUNITY

## 2023-08-24 RX ORDER — ONDANSETRON 2 MG/ML
4 INJECTION INTRAMUSCULAR; INTRAVENOUS EVERY 6 HOURS PRN
Status: DISCONTINUED | OUTPATIENT
Start: 2023-08-24 | End: 2023-08-26 | Stop reason: HOSPADM

## 2023-08-24 RX ORDER — ENOXAPARIN SODIUM 100 MG/ML
60 INJECTION SUBCUTANEOUS 2 TIMES DAILY
Status: DISCONTINUED | OUTPATIENT
Start: 2023-08-24 | End: 2023-08-26 | Stop reason: HOSPADM

## 2023-08-24 RX ORDER — LORAZEPAM 0.5 MG/1
0.5 TABLET ORAL EVERY 6 HOURS PRN
Status: DISCONTINUED | OUTPATIENT
Start: 2023-08-24 | End: 2023-08-26 | Stop reason: HOSPADM

## 2023-08-24 RX ORDER — SODIUM CHLORIDE 0.9 % (FLUSH) 0.9 %
5-40 SYRINGE (ML) INJECTION PRN
Status: DISCONTINUED | OUTPATIENT
Start: 2023-08-24 | End: 2023-08-26 | Stop reason: HOSPADM

## 2023-08-24 RX ORDER — ATORVASTATIN CALCIUM 10 MG/1
20 TABLET, FILM COATED ORAL DAILY
COMMUNITY

## 2023-08-24 RX ORDER — VANCOMYCIN 1.75 G/350ML
1250 INJECTION, SOLUTION INTRAVENOUS
Status: COMPLETED | OUTPATIENT
Start: 2023-08-24 | End: 2023-08-24

## 2023-08-24 RX ORDER — DOXYCYCLINE HYCLATE 50 MG/1
324 CAPSULE, GELATIN COATED ORAL
COMMUNITY

## 2023-08-24 RX ORDER — POLYETHYLENE GLYCOL 3350 17 G/17G
17 POWDER, FOR SOLUTION ORAL DAILY PRN
Status: DISCONTINUED | OUTPATIENT
Start: 2023-08-24 | End: 2023-08-26 | Stop reason: HOSPADM

## 2023-08-24 RX ORDER — SODIUM CHLORIDE 0.9 % (FLUSH) 0.9 %
5-40 SYRINGE (ML) INJECTION EVERY 12 HOURS SCHEDULED
Status: DISCONTINUED | OUTPATIENT
Start: 2023-08-24 | End: 2023-08-26 | Stop reason: HOSPADM

## 2023-08-24 RX ORDER — SODIUM CHLORIDE 9 MG/ML
INJECTION, SOLUTION INTRAVENOUS PRN
Status: DISCONTINUED | OUTPATIENT
Start: 2023-08-24 | End: 2023-08-26 | Stop reason: HOSPADM

## 2023-08-24 RX ORDER — LORAZEPAM 0.5 MG/1
0.5 TABLET ORAL 2 TIMES DAILY
COMMUNITY

## 2023-08-24 RX ADMIN — VANCOMYCIN 1250 MG: 1.75 INJECTION, SOLUTION INTRAVENOUS at 19:45

## 2023-08-24 RX ADMIN — ENOXAPARIN SODIUM 60 MG: 100 INJECTION SUBCUTANEOUS at 21:51

## 2023-08-24 RX ADMIN — AMLODIPINE BESYLATE 10 MG: 10 TABLET ORAL at 21:51

## 2023-08-24 RX ADMIN — MEROPENEM 1000 MG: 1 INJECTION INTRAVENOUS at 17:26

## 2023-08-24 RX ADMIN — CITALOPRAM HYDROBROMIDE 20 MG: 20 TABLET ORAL at 21:51

## 2023-08-24 RX ADMIN — ACETAMINOPHEN 325MG 650 MG: 325 TABLET ORAL at 22:55

## 2023-08-24 RX ADMIN — SODIUM CHLORIDE, PRESERVATIVE FREE 5 ML: 5 INJECTION INTRAVENOUS at 21:51

## 2023-08-24 RX ADMIN — SODIUM CHLORIDE, POTASSIUM CHLORIDE, SODIUM LACTATE AND CALCIUM CHLORIDE 1572 ML: 600; 310; 30; 20 INJECTION, SOLUTION INTRAVENOUS at 17:25

## 2023-08-24 RX ADMIN — LISINOPRIL 20 MG: 20 TABLET ORAL at 21:51

## 2023-08-24 RX ADMIN — VANCOMYCIN 1250 MG: 1.75 INJECTION, SOLUTION INTRAVENOUS at 18:30

## 2023-08-24 ASSESSMENT — PAIN SCALES - GENERAL: PAINLEVEL_OUTOF10: 6

## 2023-08-24 ASSESSMENT — ENCOUNTER SYMPTOMS
NAUSEA: 0
COUGH: 0
SHORTNESS OF BREATH: 0
CHEST TIGHTNESS: 0
ABDOMINAL PAIN: 1
DIARRHEA: 0
SORE THROAT: 0

## 2023-08-24 ASSESSMENT — PAIN DESCRIPTION - LOCATION: LOCATION: HEAD

## 2023-08-24 NOTE — ED NOTES
IENT HAD SECOND BLOOD CULTURE TAKEN. PATIENT COMMENCED ON MEDIATION AS PER MAR WITH MEROPENUM 1G IV AND IV FLUID AS ORDERED. ALLERGIES VERIFIED BY PATIENT. PATIENT INFORMED OF EFFECTS.       Aashish Mosher, RN  08/24/23 3118

## 2023-08-24 NOTE — ED NOTES
PATIENT COMMENCE ON SECOND ANTIBIOTIC AS PER, PATIENT INFORMED OF EFFECTS OF SAME.       Eddie Méndez RN  08/24/23 8828

## 2023-08-24 NOTE — ED NOTES
PATIENT OBSERVED SITTING IN BED, PATIENT ATTACHED TO MONITOR, PATIENT HAD IV AND SEPSIS WORK UP DONE.       Preeti Mina RN  08/24/23 8788

## 2023-08-24 NOTE — ED NOTES
Patient medicated per MAR, given call light.  No distress at this time all needs met       Maura Paula  08/24/23 8781

## 2023-08-24 NOTE — ED NOTES
PATIENT LEFT LYING IN BED, BREATHING EVIDENT, NO COMPLAINT VOICED. HAND OVER REPORT GIVEN TO NIGHT RN, CARE CONTINUES.        Rolf Guo RN  08/24/23 1929

## 2023-08-24 NOTE — ED PROVIDER NOTES
EMERGENCY DEPARTMENT HISTORY AND PHYSICAL EXAM    5:12 PM      Date: 8/24/2023  Patient Name: Marja Mcardle    History of Presenting Illness     Chief Complaint   Patient presents with    Rash       History From: Patient  HPI    41-year-old female presenting with pain along her pannus for 2 weeks. Reports that on August 13 she had a very bad fever and was shivering uncontrollably. She felt slightly better the next day but has still had intermittent fevers since. Pain has been persistent. Reports the pain is the worst on the left but is diffuse along pannus bilaterally. Has not noted any sores or open skin. Has not noticed any draining fluid. She has been using her typical skin care regimen. Additionally, pt noted 3 months of swelling in in RLE as well as skin darkening. Not painful. No hx blood clots. No prolonged immobility, but she has been in bed more with her recent depression. Nursing Notes were all reviewed and agreed with or any disagreements were addressed in the HPI. PCP: No primary care provider on file. Current Facility-Administered Medications   Medication Dose Route Frequency Provider Last Rate Last Admin    lactated ringers bolus bolus 1,572 mL  30 mL/kg (Ideal) IntraVENous Once Pearl Prim, DO        vancomycin (VANCOCIN) 1250 mg in 250 mL IVPB  1,250 mg IntraVENous Q2H Pearl Prim, DO        meropenem (MERREM) 1,000 mg in sodium chloride 0.9 % 100 mL IVPB (mini-bag)  1,000 mg IntraVENous Once Pearl Prim, DO         Current Outpatient Medications   Medication Sig Dispense Refill    amLODIPine (NORVASC) 10 MG tablet Take 10 mg by mouth daily      citalopram (CELEXA) 20 MG tablet Take 20 mg by mouth daily      gabapentin (NEURONTIN) 400 MG capsule Take 400 mg by mouth 2 times daily.       hydrOXYzine pamoate (VISTARIL) 50 MG capsule Take 50 mg by mouth 3 times daily as needed      lisinopril (PRINIVIL;ZESTRIL) 20 MG tablet Take 20 mg by mouth daily         Past

## 2023-08-25 ENCOUNTER — APPOINTMENT (OUTPATIENT)
Facility: HOSPITAL | Age: 43
DRG: 720 | End: 2023-08-25
Payer: COMMERCIAL

## 2023-08-25 LAB
ANION GAP SERPL CALC-SCNC: 9 MMOL/L (ref 3–18)
BASOPHILS # BLD: 0 K/UL (ref 0–0.1)
BASOPHILS NFR BLD: 0 % (ref 0–2)
BUN SERPL-MCNC: 8 MG/DL (ref 7–18)
BUN/CREAT SERPL: 10 (ref 12–20)
CALCIUM SERPL-MCNC: 9.9 MG/DL (ref 8.5–10.1)
CHLORIDE SERPL-SCNC: 102 MMOL/L (ref 100–111)
CO2 SERPL-SCNC: 25 MMOL/L (ref 21–32)
CREAT SERPL-MCNC: 0.82 MG/DL (ref 0.6–1.3)
DIFFERENTIAL METHOD BLD: ABNORMAL
EKG ATRIAL RATE: 107 BPM
EKG DIAGNOSIS: NORMAL
EKG P AXIS: 38 DEGREES
EKG P-R INTERVAL: 156 MS
EKG Q-T INTERVAL: 324 MS
EKG QRS DURATION: 74 MS
EKG QTC CALCULATION (BAZETT): 432 MS
EKG R AXIS: 41 DEGREES
EKG T AXIS: 59 DEGREES
EKG VENTRICULAR RATE: 107 BPM
EOSINOPHIL # BLD: 0 K/UL (ref 0–0.4)
EOSINOPHIL NFR BLD: 0 % (ref 0–5)
ERYTHROCYTE [DISTWIDTH] IN BLOOD BY AUTOMATED COUNT: 16.8 % (ref 11.6–14.5)
GLUCOSE SERPL-MCNC: 109 MG/DL (ref 74–99)
HCT VFR BLD AUTO: 34.8 % (ref 35–45)
HGB BLD-MCNC: 10.3 G/DL (ref 12–16)
IMM GRANULOCYTES # BLD AUTO: 0.2 K/UL (ref 0–0.04)
IMM GRANULOCYTES NFR BLD AUTO: 2 % (ref 0–0.5)
LACTATE SERPL-SCNC: 1.1 MMOL/L (ref 0.4–2)
LYMPHOCYTES # BLD: 1.4 K/UL (ref 0.9–3.6)
LYMPHOCYTES NFR BLD: 9 % (ref 21–52)
MAGNESIUM SERPL-MCNC: 2 MG/DL (ref 1.6–2.6)
MCH RBC QN AUTO: 27.6 PG (ref 24–34)
MCHC RBC AUTO-ENTMCNC: 29.6 G/DL (ref 31–37)
MCV RBC AUTO: 93.3 FL (ref 78–100)
MONOCYTES # BLD: 0.5 K/UL (ref 0.05–1.2)
MONOCYTES NFR BLD: 3 % (ref 3–10)
NEUTS SEG # BLD: 13.2 K/UL (ref 1.8–8)
NEUTS SEG NFR BLD: 86 % (ref 40–73)
NRBC # BLD: 0.03 K/UL (ref 0–0.01)
NRBC BLD-RTO: 0.2 PER 100 WBC
PLATELET # BLD AUTO: 521 K/UL (ref 135–420)
PMV BLD AUTO: 9.8 FL (ref 9.2–11.8)
POTASSIUM SERPL-SCNC: 3.4 MMOL/L (ref 3.5–5.5)
RBC # BLD AUTO: 3.73 M/UL (ref 4.2–5.3)
SODIUM SERPL-SCNC: 136 MMOL/L (ref 136–145)
WBC # BLD AUTO: 15.3 K/UL (ref 4.6–13.2)

## 2023-08-25 PROCEDURE — 97165 OT EVAL LOW COMPLEX 30 MIN: CPT

## 2023-08-25 PROCEDURE — 6360000002 HC RX W HCPCS

## 2023-08-25 PROCEDURE — 2580000003 HC RX 258

## 2023-08-25 PROCEDURE — 93971 EXTREMITY STUDY: CPT

## 2023-08-25 PROCEDURE — 6360000002 HC RX W HCPCS: Performed by: STUDENT IN AN ORGANIZED HEALTH CARE EDUCATION/TRAINING PROGRAM

## 2023-08-25 PROCEDURE — 36415 COLL VENOUS BLD VENIPUNCTURE: CPT

## 2023-08-25 PROCEDURE — 6370000000 HC RX 637 (ALT 250 FOR IP)

## 2023-08-25 PROCEDURE — 85025 COMPLETE CBC W/AUTO DIFF WBC: CPT

## 2023-08-25 PROCEDURE — 83735 ASSAY OF MAGNESIUM: CPT

## 2023-08-25 PROCEDURE — 80048 BASIC METABOLIC PNL TOTAL CA: CPT

## 2023-08-25 PROCEDURE — 93010 ELECTROCARDIOGRAM REPORT: CPT | Performed by: INTERNAL MEDICINE

## 2023-08-25 PROCEDURE — 83605 ASSAY OF LACTIC ACID: CPT

## 2023-08-25 PROCEDURE — 1100000000 HC RM PRIVATE

## 2023-08-25 RX ORDER — ATORVASTATIN CALCIUM 20 MG/1
20 TABLET, FILM COATED ORAL NIGHTLY
Status: DISCONTINUED | OUTPATIENT
Start: 2023-08-25 | End: 2023-08-26 | Stop reason: HOSPADM

## 2023-08-25 RX ORDER — TRIAMCINOLONE ACETONIDE 5 MG/G
OINTMENT TOPICAL 2 TIMES DAILY
Status: DISCONTINUED | OUTPATIENT
Start: 2023-08-26 | End: 2023-08-26 | Stop reason: HOSPADM

## 2023-08-25 RX ORDER — VANCOMYCIN 1.75 G/350ML
1250 INJECTION, SOLUTION INTRAVENOUS EVERY 12 HOURS
Status: DISCONTINUED | OUTPATIENT
Start: 2023-08-25 | End: 2023-08-26

## 2023-08-25 RX ORDER — TRAMADOL HYDROCHLORIDE 50 MG/1
50 TABLET ORAL EVERY 12 HOURS PRN
Status: DISCONTINUED | OUTPATIENT
Start: 2023-08-25 | End: 2023-08-26 | Stop reason: HOSPADM

## 2023-08-25 RX ORDER — POTASSIUM CHLORIDE 20 MEQ/1
20 TABLET, EXTENDED RELEASE ORAL ONCE
Status: COMPLETED | OUTPATIENT
Start: 2023-08-25 | End: 2023-08-25

## 2023-08-25 RX ADMIN — ATORVASTATIN CALCIUM 20 MG: 20 TABLET, FILM COATED ORAL at 21:14

## 2023-08-25 RX ADMIN — CITALOPRAM HYDROBROMIDE 30 MG: 20 TABLET ORAL at 21:14

## 2023-08-25 RX ADMIN — VANCOMYCIN 1250 MG: 1.75 INJECTION, SOLUTION INTRAVENOUS at 23:48

## 2023-08-25 RX ADMIN — SODIUM CHLORIDE, PRESERVATIVE FREE 10 ML: 5 INJECTION INTRAVENOUS at 21:18

## 2023-08-25 RX ADMIN — SODIUM CHLORIDE, PRESERVATIVE FREE 10 ML: 5 INJECTION INTRAVENOUS at 11:09

## 2023-08-25 RX ADMIN — AMLODIPINE BESYLATE 10 MG: 10 TABLET ORAL at 21:14

## 2023-08-25 RX ADMIN — LORAZEPAM 0.5 MG: 0.5 TABLET ORAL at 18:47

## 2023-08-25 RX ADMIN — VANCOMYCIN 1250 MG: 1.75 INJECTION, SOLUTION INTRAVENOUS at 13:54

## 2023-08-25 RX ADMIN — ACETAMINOPHEN 325MG 650 MG: 325 TABLET ORAL at 18:47

## 2023-08-25 RX ADMIN — VANCOMYCIN HYDROCHLORIDE 750 MG: 750 INJECTION, POWDER, LYOPHILIZED, FOR SOLUTION INTRAVENOUS at 01:46

## 2023-08-25 RX ADMIN — FLUOCINONIDE: 0.5 OINTMENT TOPICAL at 01:53

## 2023-08-25 RX ADMIN — TRAMADOL HYDROCHLORIDE 50 MG: 50 TABLET ORAL at 17:27

## 2023-08-25 RX ADMIN — ACETAMINOPHEN 325MG 650 MG: 325 TABLET ORAL at 11:09

## 2023-08-25 RX ADMIN — LISINOPRIL 20 MG: 20 TABLET ORAL at 21:14

## 2023-08-25 RX ADMIN — ENOXAPARIN SODIUM 60 MG: 100 INJECTION SUBCUTANEOUS at 08:57

## 2023-08-25 RX ADMIN — POTASSIUM CHLORIDE 20 MEQ: 1500 TABLET, EXTENDED RELEASE ORAL at 06:07

## 2023-08-25 RX ADMIN — LORAZEPAM 0.5 MG: 0.5 TABLET ORAL at 04:32

## 2023-08-25 RX ADMIN — FLUOCINONIDE: 0.5 OINTMENT TOPICAL at 13:53

## 2023-08-25 RX ADMIN — Medication: at 21:14

## 2023-08-25 RX ADMIN — Medication: at 17:26

## 2023-08-25 RX ADMIN — ENOXAPARIN SODIUM 60 MG: 100 INJECTION SUBCUTANEOUS at 21:14

## 2023-08-25 RX ADMIN — FLUOCINONIDE: 0.5 OINTMENT TOPICAL at 21:16

## 2023-08-25 ASSESSMENT — PAIN DESCRIPTION - LOCATION
LOCATION: BACK
LOCATION: HEAD

## 2023-08-25 ASSESSMENT — PAIN SCALES - GENERAL
PAINLEVEL_OUTOF10: 7
PAINLEVEL_OUTOF10: 7
PAINLEVEL_OUTOF10: 0
PAINLEVEL_OUTOF10: 0
PAINLEVEL_OUTOF10: 7
PAINLEVEL_OUTOF10: 6
PAINLEVEL_OUTOF10: 7
PAINLEVEL_OUTOF10: 7
PAINLEVEL_OUTOF10: 2
PAINLEVEL_OUTOF10: 0

## 2023-08-25 ASSESSMENT — PAIN DESCRIPTION - DESCRIPTORS
DESCRIPTORS: ACHING

## 2023-08-25 NOTE — PROGRESS NOTES
conducted an initial consultation and Spiritual Assessment for Bairon Cuadra, who is a 39 y.o.,female. Patient's Primary Language is: [place]. According to the patient's EMR Mosque Affiliation is: [facility].     The reason the Patient came to the hospital is:   Patient Active Problem List    Diagnosis Date Noted    Abdominal wall cellulitis 08/24/2023    Primary hypertension 12/02/2021    Class 3 severe obesity in adult [facility]) 11/28/2021    Major depressive disorder, recurrent episode, severe with anxious distress ([address]) 11/28/2021    Left knee pain     Back pain         The  provided the following Interventions:  Initiated a relationship of care and support. Listened empathically. Provided information about Spiritual Care Services. Offered prayer and assurance of continued prayers on patient's behalf. Chart reviewed. The following outcomes where achieved:  Patient expressed gratitude for 's visit. Assessment:  Patient does not have any Hindu/cultural needs that will affect patient's preferences in health care. There are no spiritual or Hindu issues which require intervention at this time. Plan:  Chaplains will continue to follow and will provide pastoral care on an as needed/requested basis.  recommends bedside caregivers page  on duty if patient shows signs of acute spiritual or emotional distress.     [facility]   [phone]

## 2023-08-25 NOTE — H&P
[address] [facility]  Admission History and Physical      Patient:    Victor M Courtney      39 y.o. female            MRN:       [id number]                                                                                    Admission Date:         8/24/2023  Code status:                Full Code    ASSESSMENT AND PLAN  Victor M Courtney is a 39y.o. year old female with PMH of HTN, HLD, Anxiety, Depression, and Eczema admitted for Septicemia ([address] [facility]) [A41.9]  Cellulitis, abdominal wall [L03.311]  Abdominal wall cellulitis [L03.311]  Morbid obesity with BMI of 60.0-69.9, adult ([address] [facility]) [E66.01, Z68.44]. Sepsis d/t Abdominal Cellulitis  - 2 weeks of abdominal wall pain with physical exam demonstrating tender indurated erythematous midabdominal rash with an ulcerative lesion. No drainage  - Presented to ED with tachycardia, tachypnea, fever, and WBC of 17.3  - was given meropenem, vancomycin & 1.5 L of LR in the ED  Plan:  - continue vancomycin  - consult ID   - follow wound cultures  - follow blood cultures      Venous Stasis Dermatitis   - pruritic lichenified hyperpigmentation along the circumference of the RLE below the knee w/ significant non-pitting edema. Non tender  Plan:  - Fluocinonide 0.05% ointment BID for 14 days (ending 9/7)  - PVLs to rule out DVT    Headache  - reports a couple days of headache  Plan:  - tylenol PRN for headache    Anemia  - hgb 9.0. Likely AOCD due to MCV of 91. Plan  - continue to monitor  - patient declines blood products    HTN  - on amlodipine and lisinopril at home  Plan:  - continue home amlodipine   - continue home lisinopril     HLD  - on atorvastatin at home  Plan:  - continue home atorvastatin     Anxiety   Depression  - patient reports she takes 30mg of citalopram at home.  On prn Lorazepam for anxiety  Plan:  - continue home citalopram  - continue home ativan PRN        Global:  Code: Full Code  IVF/Drips: Vancomycin  Diet: Regular  Bowel Regimen: Miralax PRN  DVT/AC:

## 2023-08-25 NOTE — PROGRESS NOTES
Physical Therapy    PT order received and chart reviewed. Pt is at functional baseline with no acute deficits. Does not warrant further acute PT. Will sign off.  Thank you for this referral. Tushar Wilson, PT, DPT

## 2023-08-25 NOTE — ED NOTES
TRANSFER - OUT REPORT:    Verbal report given to [address] [place], [place] [address]MILIND on Marja Mcardle  being transferred to [facility] for routine progression of patient care       Report consisted of patient's Situation, Background, Assessment and   Recommendations(SBAR). Information from the following report(s) Nurse Handoff Report, Index, ED Encounter Summary, ED SBAR, and MAR was reviewed with the receiving nurse. Shelly Fall Assessment:                           Lines:   Peripheral IV 08/24/23 Right Antecubital (Active)   Site Assessment Clean, dry & intact 08/24/23 1607   Line Status Blood return noted 08/24/23 1607   Phlebitis Assessment No symptoms 08/24/23 1607   Infiltration Assessment 0 08/24/23 1607   Dressing Status New dressing applied;Clean, dry & intact 08/24/23 1607   Dressing Type Transparent 08/24/23 1607   Dressing Intervention New 08/24/23 1607        Opportunity for questions and clarification was provided.       Patient transported with:  Tien Herron RN  08/24/23 4148

## 2023-08-26 VITALS
WEIGHT: 293 LBS | HEART RATE: 100 BPM | RESPIRATION RATE: 18 BRPM | BODY MASS INDEX: 51.91 KG/M2 | OXYGEN SATURATION: 97 % | DIASTOLIC BLOOD PRESSURE: 76 MMHG | TEMPERATURE: 98.9 F | SYSTOLIC BLOOD PRESSURE: 171 MMHG | HEIGHT: 63 IN

## 2023-08-26 LAB
ANION GAP SERPL CALC-SCNC: 4 MMOL/L (ref 3–18)
BASOPHILS # BLD: 0 K/UL (ref 0–0.1)
BASOPHILS NFR BLD: 0 % (ref 0–2)
BUN SERPL-MCNC: 9 MG/DL (ref 7–18)
BUN/CREAT SERPL: 12 (ref 12–20)
CALCIUM SERPL-MCNC: 9 MG/DL (ref 8.5–10.1)
CHLORIDE SERPL-SCNC: 105 MMOL/L (ref 100–111)
CO2 SERPL-SCNC: 28 MMOL/L (ref 21–32)
CREAT SERPL-MCNC: 0.78 MG/DL (ref 0.6–1.3)
DIFFERENTIAL METHOD BLD: ABNORMAL
EOSINOPHIL # BLD: 0.1 K/UL (ref 0–0.4)
EOSINOPHIL NFR BLD: 1 % (ref 0–5)
ERYTHROCYTE [DISTWIDTH] IN BLOOD BY AUTOMATED COUNT: 16.9 % (ref 11.6–14.5)
GLUCOSE SERPL-MCNC: 107 MG/DL (ref 74–99)
HCT VFR BLD AUTO: 28.8 % (ref 35–45)
HGB BLD-MCNC: 8.5 G/DL (ref 12–16)
IMM GRANULOCYTES # BLD AUTO: 0.2 K/UL (ref 0–0.04)
IMM GRANULOCYTES NFR BLD AUTO: 1 % (ref 0–0.5)
LYMPHOCYTES # BLD: 1.6 K/UL (ref 0.9–3.6)
LYMPHOCYTES NFR BLD: 11 % (ref 21–52)
MCH RBC QN AUTO: 28 PG (ref 24–34)
MCHC RBC AUTO-ENTMCNC: 29.5 G/DL (ref 31–37)
MCV RBC AUTO: 94.7 FL (ref 78–100)
MONOCYTES # BLD: 0.7 K/UL (ref 0.05–1.2)
MONOCYTES NFR BLD: 5 % (ref 3–10)
NEUTS SEG # BLD: 11.6 K/UL (ref 1.8–8)
NEUTS SEG NFR BLD: 82 % (ref 40–73)
NRBC # BLD: 0.03 K/UL (ref 0–0.01)
NRBC BLD-RTO: 0.2 PER 100 WBC
PLATELET # BLD AUTO: 486 K/UL (ref 135–420)
PMV BLD AUTO: 10.6 FL (ref 9.2–11.8)
POTASSIUM SERPL-SCNC: 3.7 MMOL/L (ref 3.5–5.5)
RBC # BLD AUTO: 3.04 M/UL (ref 4.2–5.3)
SODIUM SERPL-SCNC: 137 MMOL/L (ref 136–145)
VANCOMYCIN SERPL-MCNC: 10.1 UG/ML (ref 5–40)
WBC # BLD AUTO: 14.2 K/UL (ref 4.6–13.2)

## 2023-08-26 PROCEDURE — 6370000000 HC RX 637 (ALT 250 FOR IP): Performed by: STUDENT IN AN ORGANIZED HEALTH CARE EDUCATION/TRAINING PROGRAM

## 2023-08-26 PROCEDURE — 36415 COLL VENOUS BLD VENIPUNCTURE: CPT

## 2023-08-26 PROCEDURE — 6370000000 HC RX 637 (ALT 250 FOR IP)

## 2023-08-26 PROCEDURE — 6360000002 HC RX W HCPCS

## 2023-08-26 PROCEDURE — 80048 BASIC METABOLIC PNL TOTAL CA: CPT

## 2023-08-26 PROCEDURE — 85025 COMPLETE CBC W/AUTO DIFF WBC: CPT

## 2023-08-26 PROCEDURE — 80202 ASSAY OF VANCOMYCIN: CPT

## 2023-08-26 PROCEDURE — 2580000003 HC RX 258

## 2023-08-26 RX ORDER — DOXYCYCLINE HYCLATE 100 MG
100 TABLET ORAL 2 TIMES DAILY
Qty: 16 TABLET | Refills: 0 | Status: SHIPPED | OUTPATIENT
Start: 2023-08-26 | End: 2023-09-03

## 2023-08-26 RX ORDER — HYDROXYZINE HYDROCHLORIDE 25 MG/1
25 TABLET, FILM COATED ORAL 3 TIMES DAILY PRN
Status: DISCONTINUED | OUTPATIENT
Start: 2023-08-26 | End: 2023-08-26 | Stop reason: HOSPADM

## 2023-08-26 RX ADMIN — Medication: at 08:46

## 2023-08-26 RX ADMIN — LORAZEPAM 0.5 MG: 0.5 TABLET ORAL at 12:21

## 2023-08-26 RX ADMIN — GABAPENTIN 600 MG: 300 CAPSULE ORAL at 12:21

## 2023-08-26 RX ADMIN — HYDROXYZINE HYDROCHLORIDE 25 MG: 25 TABLET, FILM COATED ORAL at 00:45

## 2023-08-26 RX ADMIN — ENOXAPARIN SODIUM 60 MG: 100 INJECTION SUBCUTANEOUS at 08:44

## 2023-08-26 RX ADMIN — TRIAMCINOLONE ACETONIDE: 5 OINTMENT TOPICAL at 08:45

## 2023-08-26 RX ADMIN — ACETAMINOPHEN 325MG 650 MG: 325 TABLET ORAL at 08:45

## 2023-08-26 RX ADMIN — GABAPENTIN 600 MG: 300 CAPSULE ORAL at 00:45

## 2023-08-26 RX ADMIN — FLUOCINONIDE: 0.5 OINTMENT TOPICAL at 08:45

## 2023-08-26 RX ADMIN — SODIUM CHLORIDE, PRESERVATIVE FREE 10 ML: 5 INJECTION INTRAVENOUS at 08:46

## 2023-08-26 RX ADMIN — TRAMADOL HYDROCHLORIDE 50 MG: 50 TABLET ORAL at 08:45

## 2023-08-26 ASSESSMENT — PAIN DESCRIPTION - DESCRIPTORS: DESCRIPTORS: ACHING

## 2023-08-26 ASSESSMENT — PAIN SCALES - GENERAL
PAINLEVEL_OUTOF10: 8
PAINLEVEL_OUTOF10: 0
PAINLEVEL_OUTOF10: 4
PAINLEVEL_OUTOF10: 6
PAINLEVEL_OUTOF10: 0

## 2023-08-26 ASSESSMENT — PAIN DESCRIPTION - LOCATION
LOCATION: BACK;HEAD
LOCATION: BACK

## 2023-08-26 NOTE — PROGRESS NOTES
[address] [facility]  DAILY PROGRESS NOTE      Patient:    Dinora Skinner , 39 y.o. female   MRN:  [id number]  Room/Bed:  461/01  Admission Date:   8/24/2023  Code status:  Full Code    Reason for Admission: Dinora Skinner is a 39y.o. year old female with PMH of HTN, HLD, Anxiety, Depression, and Eczema admitted for Septicemia ([address] [facility]) [A41.9]  Cellulitis, abdominal wall [L03.311]  Abdominal wall cellulitis [L03.311]  Morbid obesity with BMI of 60.0-69.9, adult ([address] [facility]) [E66.01, Z68.44]. Responding well to abx. Stable enough to D/C today. ASSESSMENT AND PLAN:   Sepsis d/t Abdominal Cellulitis  - 2 weeks of abdominal wall pain with physical exam demonstrating tender indurated erythematous lower abdominal rash with an ulcerative lesion around umbilicus. No drainage  - Presented to ED with tachycardia, tachypnea, fever, and WBC of 17.3  -Leukocytosis continues to  improve to 14.2 this AM  -Normal temp this AM  -8/24 blood cultures w/ no growth to date  -8/24 wound culture growing 3+ gram negative rods   -8/24 UA negatve for UTI  Plan:  - continue vancomycin  - consider consult ID if symptoms not improving  - follow blood cultures   -plan to d/c on doxycycline today     Venous Stasis Dermatitis   - pruritic lichenified hyperpigmentation along the circumference of the RLE below the knee w/ significant non-pitting edema. Non tender. Patient reports that her RLE sometimes weeps clear fluid.    -8/25 PVLs: no DVTs in BLEs; vessels with normal compressibility and flow  Plan:  - Fluocinonide 0.05% ointment BID for 14 days (ending 9/7)  -triamcinolone 0.5% ointment to be used if Fluocinonide ointment not available   -OP FU with Dermatology     Dermatitis, Unspecified  Ddx Atopic Dermatitis vs psoriasis vs. xeroderma  -dry scaly hyperpigmented pruritic patches observed on the L and R arm, resembling atopic dermatitis  -patient with known hx of severe atopic dermatitis involving entire circumference of upper

## 2023-08-26 NOTE — CARE COORDINATION
D/C order noted for today. Orders reviewed. No needs identified at this time. Family or friend to transport patient home today for discharge. CM remains available if needed.            Pradeep Darby -[phone]

## 2023-08-26 NOTE — DISCHARGE INSTRUCTIONS
DISCHARGE SUMMARY from Nurse    PATIENT INSTRUCTIONS:    After general anesthesia or intravenous sedation, for 24 hours or while taking prescription Narcotics:  Limit your activities  Do not drive and operate hazardous machinery  Do not make important personal or business decisions  Do  not drink alcoholic beverages  If you have not urinated within 8 hours after discharge, please contact your surgeon on call. Report the following to your surgeon:  Excessive pain, swelling, redness or odor of or around the surgical area  Temperature over 100.5  Nausea and vomiting lasting longer than 4 hours or if unable to take medications  Any signs of decreased circulation or nerve impairment to extremity: change in color, persistent  numbness, tingling, coldness or increase pain  Any questions    What to do at Home:  Recommended activity: activity as tolerated. If you experience any symptoms, please follow up with your primary care provider. *  Please give a list of your current medications to your Primary Care Provider. *  Please update this list whenever your medications are discontinued, doses are      changed, or new medications (including over-the-counter products) are added. *  Please carry medication information at all times in case of emergency situations. These are general instructions for a healthy lifestyle:    No smoking/ No tobacco products/ Avoid exposure to second hand smoke  Surgeon General's Warning:  Quitting smoking now greatly reduces serious risk to your health.     Obesity, smoking, and sedentary lifestyle greatly increases your risk for illness    A healthy diet, regular physical exercise & weight monitoring are important for maintaining a healthy lifestyle    You may be retaining fluid if you have a history of heart failure or if you experience any of the following symptoms:  Weight gain of 3 pounds or more overnight or 5 pounds in a week, increased swelling in our hands or feet or shortness of breath while lying flat in bed. Please call your doctor as soon as you notice any of these symptoms; do not wait until your next office visit. The discharge information has been reviewed with the patient. The patient verbalized understanding. Discharge medications reviewed with the patient and appropriate educational materials and side effects teaching were provided.   ___________________________________________________________________________________________________________________________________

## 2023-08-26 NOTE — CARE COORDINATION
08/26/23 1638   Service Assessment   Patient Orientation Unable to Assess   History Provided By Medical Record   Support Systems Family Members   Prior Functional Level Independent in ADLs/IADLs   Current Functional Level Independent in ADLs/IADLs   Financial Resources Medicaid   Social/Functional History   Lives With Family  (Patient lives with her sister.)   Type of Home   [facility])   Home Layout One level   Bathroom Shower/Tub Tub/Shower unit   [facility] None   ADL Assistance Independent   Homemaking Assistance Independent   Ambulation Assistance Independent   Transfer Assistance Independent   Discharge Planning   Type of Residence Other (Comment)  [facility])   Living Arrangements Family Members  (Patient lives with her sister.)   Current Services Prior To Admission None   Potential Assistance Needed N/A   DME Ordered? Other (comment)   Potential Assistance Purchasing Medications No   Type of Home Care Services None   Patient expects to be discharged to: Other (comment)  ([facility] with [address] Discharge   Transition of Care Consult (CM Consult) N/A   Services At/After Discharge None   Mode of Transport at Discharge Self   Condition of Participation: Discharge Planning   The Plan for Transition of Care is related to the following treatment goals: [facility] with [employer]. Case Management Assessment  Initial Evaluation    Date/Time of Evaluation: 8/26/2023 4:41 PM  Assessment Completed by: Magdaleno Walls    If patient is discharged prior to next notation, then this note serves as note for discharge by case management.     Patient Name: Karolina Pro                   YOB: 1980  Diagnosis: Septicemia ([address] [facility]) [A41.9]  Cellulitis, abdominal wall [I93.861]  Abdominal wall cellulitis [T59.605]  Morbid obesity with BMI of 60.0-69.9, adult ([address] [facility]) [E66.01, Z68.44] Date / Time: 8/24/2023  3:32 PM    Patient Admission Status: Inpatient   Readmission Risk (Low < 19, Mod (19-27), High > 27): Readmission Risk Score: 10.8    Current PCP: No primary care provider on file. PCP verified by CM? Chart Reviewed: Yes      History Provided by: (P) Medical Record  Patient Orientation: (P) Unable to Assess    Patient Cognition:      Hospitalization in the last 30 days (Readmission):  No    If yes, Readmission Assessment in  Navigator will be completed. Advance Directives:      Code Status: Full Code   Patient's Primary Decision Maker is:        Discharge Planning:    Patient lives with: (P) Family Members (Patient lives with her sister.) Type of Home: (P) Other (Comment) (Hotel)  Primary Care Giver:    Patient Support Systems include: (P) Family Members   Current Financial resources: (P) Medicaid  Current community resources:    Current services prior to admission: (P) None            Current DME:              Type of Home Care services:  (P) None    ADLS  Prior functional level: (P) Independent in ADLs/IADLs  Current functional level: (P) Independent in ADLs/IADLs    PT AM-PAC:   /24  OT AM-PAC: 22 /24    Family can provide assistance at DC: Would you like Case Management to discuss the discharge plan with any other family members/significant others, and if so, who? Plans to Return to Present Housing:    Other Identified Issues/Barriers to RETURNING to current housing:  None at this time.    Potential Assistance needed at discharge: (P) N/A            Potential DME:    Patient expects to discharge to: (P) Other (comment) (Hotel with Family Assistance.)  Plan for transportation at discharge:      Financial    Payor: [address] [place] [address] / Plan: [address] [place] [address] / Product Type: *No Product type* /         Potential assistance Purchasing Medications: (P) No  Meds-to-Beds request:        [address] - [place], [address] [place]LINE

## 2023-08-26 NOTE — PLAN OF CARE
Problem: Safety - Adult  Goal: Free from fall injury  Outcome: Progressing  Flowsheets (Taken 8/25/2023 0857 by Cl Duran RN)  Free From Fall Injury: Instruct family/caregiver on patient safety     Problem: Pain  Goal: Verbalizes/displays adequate comfort level or baseline comfort level  Outcome: Progressing     Problem: Skin/Tissue Integrity  Goal: Absence of new skin breakdown  Description: 1. Monitor for areas of redness and/or skin breakdown  2. Assess vascular access sites hourly  3. Every 4-6 hours minimum:  Change oxygen saturation probe site  4. Every 4-6 hours:  If on nasal continuous positive airway pressure, respiratory therapy assess nares and determine need for appliance change or resting period.   Outcome: Progressing

## 2023-08-26 NOTE — DISCHARGE SUMMARY
[facility]  DISCHARGE SUMMARY      Name:   Marja Mcardle 39 y.o. female  MRN:   [id number]  CSN:   [id number]  Admission Date:  8/24/2023  Discharge Date:  8/26/23  Attending:             Dr. Wolfgang Berman  PCP:              Dr. Niyah Hobson   ================================================================  Reason for Admission:  Septicemia [facility]) [A41.9]  Cellulitis, abdominal wall [L03.311]  Abdominal wall cellulitis [R47.139]  Morbid obesity with BMI of 60.0-69.9, adult ([address] ) [E66.01, Z68.44]    Discharge Diagnosis:    Sepsis d/t Abdominal Cellulitis  Venous Stasis Dermatitis   Dermatitis, Unspecified  Chronic Low Back Pain  Headache  Anemia    Follow-up studies/evaluations for PCP/Important Notes to PCP:  Please Ensure patient compliant with abx regimen and cellulitis resolved   Please set up referral to Dermatology for Dermatitis  WBC 14.2 and Hgb 8.5 prior to D/C; please recheck CBC  Please FU blood cultures done at [facility] to make sure final result shows no growth; currently only prelim result is available   Medication reconciliation:  Discontinued Medications: BC Aspirin  New Medications: Doxycycline 100 mg BID x 8 days      DELPHINE Follow Up Appointment:   Follow-up Information       Follow up With Specialties Details Why Contact Info    Amy George MD Family Medicine Follow up on 8/31/2023 [address] Visit with Dr. Amy George at 3:20 pm [address]  [phone]               Readmission prevention plan:   Complete antibiotic regimen     GOALS OF CARE (including Code Status, Advanced Care Plan):    Full Measures    Pending labs/ investigations at discharge to follow up:   FU Blood Cultures    Operative Procedures:   N/a    Consultants:    N/a    Condition at discharge: Stable    Disposition at Discharge:  Home    Functional Status at Discharge: Ambulates without assistance    Diet: Regular diet    Discharge Medications:     Medication gram negative rods. Blood cultures were drawn with no growth after 2 days. After starting abx treatment patient began to feel better and was no longer feeling feverish, no longer had body aches or malaise. She also complained of R lower leg swellling that began a couple of months prior. She endorsed itching and intermittent serous oozing of  that leg along with a hyperpigmented rash involving her whole R Leg. BLE PVLs were checked which showed no DVT with normal vessel compressibility and flow. Patient is stable enough for discharge on PO abx and is to FU in clinic within the next week. Patient's problem list was managed in hospital as stated below:     Sepsis d/t Abdominal Cellulitis  - treated with vancomycin while inpatient  - sent home on doxycycline given wound cultures with gram neg rods  -blood cultures checked with no growth x 2 days      Venous Stasis Dermatitis   - BLE PVLs checked and negative for DVT  - triamcinolone 0.5% applied to affected skin    Dermatitis, Unspecified  Ddx Atopic Dermatitis vs psoriasis vs. Xeroderma, involving L and R arms  -Aquaphor BID  -plan for dermatology referral outpatient      Chronic Low Back Pain  -home tramadol    Headache  - tylenol PRN   -counseled to d/c chronic BC aspirin use    Anemia  -CBC monitored    HTN  -home atorvastatin    Anxiety   Depression  -home citalopram  -home PRN ativan      Pertinent Results:      CURRENT ADMISSION IMAGING RESULTS   8/25 BLEs PVLs  No evidence of deep vein thrombosis in the right lower extremity. Vessels demonstrate normal compressibility, color filling, and phasic and spontaneous flow. No evidence of deep vein thrombosis in the left lower extremity. Vessels demonstrate normal compressibility, color filling, and phasic and spontaneous flow.       Cardiology Procedures/Testing:  MODALITY RESULTS   8/24 EKG Sinus tachycardia   Low voltage QRS   Nonspecific T wave abnormality                   Special

## 2023-08-27 LAB
BACTERIA SPEC CULT: ABNORMAL
BACTERIA SPEC CULT: NORMAL
ECHO BSA: 2.79 M2
GRAM STN SPEC: ABNORMAL
GRAM STN SPEC: ABNORMAL
SERVICE CMNT-IMP: ABNORMAL
SERVICE CMNT-IMP: NORMAL

## 2023-08-30 LAB
BACTERIA SPEC CULT: ABNORMAL
BACTERIA SPEC CULT: NORMAL
BACTERIA SPEC CULT: NORMAL
GRAM STN SPEC: ABNORMAL
GRAM STN SPEC: ABNORMAL
SERVICE CMNT-IMP: ABNORMAL
SERVICE CMNT-IMP: NORMAL
SERVICE CMNT-IMP: NORMAL

## 2024-02-02 ENCOUNTER — HOSPITAL ENCOUNTER (INPATIENT)
Facility: HOSPITAL | Age: 44
LOS: 6 days | Discharge: HOME | End: 2024-02-09
Attending: STUDENT IN AN ORGANIZED HEALTH CARE EDUCATION/TRAINING PROGRAM | Admitting: PSYCHIATRY & NEUROLOGY
Payer: MEDICAID

## 2024-02-02 DIAGNOSIS — G89.29 CHRONIC NEUROPATHIC PAIN: ICD-10-CM

## 2024-02-02 DIAGNOSIS — M79.2 CHRONIC NEUROPATHIC PAIN: ICD-10-CM

## 2024-02-02 DIAGNOSIS — F41.1 GAD (GENERALIZED ANXIETY DISORDER): Primary | ICD-10-CM

## 2024-02-02 DIAGNOSIS — R45.851 SUICIDAL IDEATION: ICD-10-CM

## 2024-02-02 LAB
ALBUMIN SERPL-MCNC: 3.8 G/DL (ref 3.4–5)
ALBUMIN/GLOB SERPL: 0.8 (ref 0.8–1.7)
ALP SERPL-CCNC: 103 U/L (ref 45–117)
ALT SERPL-CCNC: 41 U/L (ref 13–56)
AMPHET UR QL SCN: NEGATIVE
ANION GAP SERPL CALC-SCNC: 3 MMOL/L (ref 3–18)
AST SERPL-CCNC: 12 U/L (ref 10–38)
BARBITURATES UR QL SCN: NEGATIVE
BASOPHILS # BLD: 0.1 K/UL (ref 0–0.1)
BASOPHILS NFR BLD: 0 % (ref 0–2)
BENZODIAZ UR QL: NEGATIVE
BILIRUB SERPL-MCNC: 0.3 MG/DL (ref 0.2–1)
BUN SERPL-MCNC: 15 MG/DL (ref 7–18)
BUN/CREAT SERPL: 15 (ref 12–20)
CALCIUM SERPL-MCNC: 10.7 MG/DL (ref 8.5–10.1)
CANNABINOIDS UR QL SCN: NEGATIVE
CHLORIDE SERPL-SCNC: 107 MMOL/L (ref 100–111)
CO2 SERPL-SCNC: 27 MMOL/L (ref 21–32)
COCAINE UR QL SCN: NEGATIVE
CREAT SERPL-MCNC: 1.03 MG/DL (ref 0.6–1.3)
DIFFERENTIAL METHOD BLD: ABNORMAL
EOSINOPHIL # BLD: 0.1 K/UL (ref 0–0.4)
EOSINOPHIL NFR BLD: 1 % (ref 0–5)
ERYTHROCYTE [DISTWIDTH] IN BLOOD BY AUTOMATED COUNT: 15.1 % (ref 11.6–14.5)
ETHANOL SERPL-MCNC: <3 MG/DL (ref 0–3)
FLUAV RNA SPEC QL NAA+PROBE: NOT DETECTED
FLUBV RNA SPEC QL NAA+PROBE: NOT DETECTED
GLOBULIN SER CALC-MCNC: 4.5 G/DL (ref 2–4)
GLUCOSE SERPL-MCNC: 123 MG/DL (ref 74–99)
HCG UR QL: NEGATIVE
HCT VFR BLD AUTO: 38.3 % (ref 35–45)
HGB BLD-MCNC: 11.7 G/DL (ref 12–16)
IMM GRANULOCYTES # BLD AUTO: 0.1 K/UL (ref 0–0.04)
IMM GRANULOCYTES NFR BLD AUTO: 1 % (ref 0–0.5)
LYMPHOCYTES # BLD: 1.6 K/UL (ref 0.9–3.6)
LYMPHOCYTES NFR BLD: 14 % (ref 21–52)
Lab: NORMAL
MCH RBC QN AUTO: 28 PG (ref 24–34)
MCHC RBC AUTO-ENTMCNC: 30.5 G/DL (ref 31–37)
MCV RBC AUTO: 91.6 FL (ref 78–100)
METHADONE UR QL: NEGATIVE
MONOCYTES # BLD: 0.4 K/UL (ref 0.05–1.2)
MONOCYTES NFR BLD: 4 % (ref 3–10)
NEUTS SEG # BLD: 9.5 K/UL (ref 1.8–8)
NEUTS SEG NFR BLD: 81 % (ref 40–73)
NRBC # BLD: 0 K/UL (ref 0–0.01)
NRBC BLD-RTO: 0 PER 100 WBC
OPIATES UR QL: NEGATIVE
PCP UR QL: NEGATIVE
PLATELET # BLD AUTO: 419 K/UL (ref 135–420)
PMV BLD AUTO: 10.2 FL (ref 9.2–11.8)
POTASSIUM SERPL-SCNC: 4.1 MMOL/L (ref 3.5–5.5)
PROT SERPL-MCNC: 8.3 G/DL (ref 6.4–8.2)
RBC # BLD AUTO: 4.18 M/UL (ref 4.2–5.3)
SALICYLATES SERPL-MCNC: 7.8 MG/DL (ref 2.8–20)
SARS-COV-2 RNA RESP QL NAA+PROBE: NOT DETECTED
SODIUM SERPL-SCNC: 137 MMOL/L (ref 136–145)
WBC # BLD AUTO: 11.8 K/UL (ref 4.6–13.2)

## 2024-02-02 PROCEDURE — 80053 COMPREHEN METABOLIC PANEL: CPT

## 2024-02-02 PROCEDURE — 6370000000 HC RX 637 (ALT 250 FOR IP): Performed by: STUDENT IN AN ORGANIZED HEALTH CARE EDUCATION/TRAINING PROGRAM

## 2024-02-02 PROCEDURE — 80179 DRUG ASSAY SALICYLATE: CPT

## 2024-02-02 PROCEDURE — 85025 COMPLETE CBC W/AUTO DIFF WBC: CPT

## 2024-02-02 PROCEDURE — 99285 EMERGENCY DEPT VISIT HI MDM: CPT

## 2024-02-02 PROCEDURE — 82077 ASSAY SPEC XCP UR&BREATH IA: CPT

## 2024-02-02 PROCEDURE — 87636 SARSCOV2 & INF A&B AMP PRB: CPT

## 2024-02-02 PROCEDURE — 81025 URINE PREGNANCY TEST: CPT

## 2024-02-02 PROCEDURE — 80307 DRUG TEST PRSMV CHEM ANLYZR: CPT

## 2024-02-02 RX ORDER — LORAZEPAM 0.5 MG/1
1 TABLET ORAL 3 TIMES DAILY
Status: DISCONTINUED | OUTPATIENT
Start: 2024-02-02 | End: 2024-02-06

## 2024-02-02 RX ORDER — AMLODIPINE BESYLATE 10 MG/1
20 TABLET ORAL DAILY
Status: DISCONTINUED | OUTPATIENT
Start: 2024-02-03 | End: 2024-02-05

## 2024-02-02 RX ORDER — DULOXETIN HYDROCHLORIDE 20 MG/1
20 CAPSULE, DELAYED RELEASE ORAL 2 TIMES DAILY
Status: DISCONTINUED | OUTPATIENT
Start: 2024-02-02 | End: 2024-02-04

## 2024-02-02 RX ORDER — QUETIAPINE FUMARATE 25 MG/1
25 TABLET, FILM COATED ORAL NIGHTLY
Status: DISCONTINUED | OUTPATIENT
Start: 2024-02-02 | End: 2024-02-04

## 2024-02-02 RX ADMIN — LORAZEPAM 1 MG: 1 TABLET ORAL at 17:38
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
## 2024-02-03 PROBLEM — F32.A DEPRESSION: Status: ACTIVE | Noted: 2024-02-03

## 2024-02-03 PROCEDURE — 1240000000 HC EMOTIONAL WELLNESS R&B

## 2024-02-03 PROCEDURE — 6370000000 HC RX 637 (ALT 250 FOR IP): Performed by: STUDENT IN AN ORGANIZED HEALTH CARE EDUCATION/TRAINING PROGRAM

## 2024-02-03 PROCEDURE — 6370000000 HC RX 637 (ALT 250 FOR IP): Performed by: PSYCHIATRY & NEUROLOGY

## 2024-02-03 PROCEDURE — 6370000000 HC RX 637 (ALT 250 FOR IP): Performed by: EMERGENCY MEDICINE

## 2024-02-03 RX ORDER — BENZTROPINE MESYLATE 1 MG/ML
1 INJECTION INTRAMUSCULAR; INTRAVENOUS 2 TIMES DAILY PRN
Status: CANCELLED | OUTPATIENT
Start: 2024-02-03

## 2024-02-03 RX ORDER — INSULIN LISPRO 100 [IU]/ML
0-4 INJECTION, SOLUTION INTRAVENOUS; SUBCUTANEOUS NIGHTLY
Status: DISCONTINUED | OUTPATIENT
Start: 2024-02-03 | End: 2024-02-04

## 2024-02-03 RX ORDER — HALOPERIDOL 5 MG/ML
INJECTION INTRAMUSCULAR EVERY 6 HOURS PRN
Status: CANCELLED | OUTPATIENT
Start: 2024-02-03

## 2024-02-03 RX ORDER — HALOPERIDOL 5 MG/1
5 TABLET ORAL EVERY 6 HOURS PRN
Status: CANCELLED | OUTPATIENT
Start: 2024-02-03

## 2024-02-03 RX ORDER — GABAPENTIN 600 MG/1
600 TABLET ORAL 3 TIMES DAILY
Status: ON HOLD | COMMUNITY
End: 2024-02-09 | Stop reason: HOSPADM

## 2024-02-03 RX ORDER — DEXTROSE MONOHYDRATE 100 MG/ML
INJECTION, SOLUTION INTRAVENOUS CONTINUOUS PRN
Status: DISCONTINUED | OUTPATIENT
Start: 2024-02-03 | End: 2024-02-09 | Stop reason: HOSPADM

## 2024-02-03 RX ORDER — MELOXICAM 7.5 MG/1
7.5 TABLET ORAL DAILY
Status: DISCONTINUED | OUTPATIENT
Start: 2024-02-03 | End: 2024-02-09 | Stop reason: HOSPADM

## 2024-02-03 RX ORDER — HYDROXYZINE PAMOATE 50 MG/1
50 CAPSULE ORAL 3 TIMES DAILY PRN
Status: DISCONTINUED | OUTPATIENT
Start: 2024-02-03 | End: 2024-02-04

## 2024-02-03 RX ORDER — ACETAMINOPHEN 325 MG/1
650 TABLET ORAL EVERY 6 HOURS PRN
Status: DISCONTINUED | OUTPATIENT
Start: 2024-02-03 | End: 2024-02-09 | Stop reason: HOSPADM

## 2024-02-03 RX ORDER — ATORVASTATIN CALCIUM 10 MG/1
10 TABLET, FILM COATED ORAL NIGHTLY
Status: DISCONTINUED | OUTPATIENT
Start: 2024-02-03 | End: 2024-02-09 | Stop reason: HOSPADM

## 2024-02-03 RX ORDER — FOLIC ACID 1 MG/1
1 TABLET ORAL DAILY
Status: DISCONTINUED | OUTPATIENT
Start: 2024-02-03 | End: 2024-02-09 | Stop reason: HOSPADM

## 2024-02-03 RX ORDER — MULTIVIT WITH MINERALS/LUTEIN
500 TABLET ORAL DAILY
Status: DISCONTINUED | OUTPATIENT
Start: 2024-02-03 | End: 2024-02-09 | Stop reason: HOSPADM

## 2024-02-03 RX ORDER — LORAZEPAM 2 MG/ML
INJECTION INTRAMUSCULAR EVERY 6 HOURS PRN
Status: CANCELLED | OUTPATIENT
Start: 2024-02-03

## 2024-02-03 RX ORDER — INSULIN LISPRO 100 [IU]/ML
0-4 INJECTION, SOLUTION INTRAVENOUS; SUBCUTANEOUS
Status: DISCONTINUED | OUTPATIENT
Start: 2024-02-03 | End: 2024-02-04

## 2024-02-03 RX ORDER — LORAZEPAM 0.5 MG/1
0.5 TABLET ORAL EVERY 4 HOURS PRN
Status: CANCELLED | OUTPATIENT
Start: 2024-02-03

## 2024-02-03 RX ORDER — TRAZODONE HYDROCHLORIDE 50 MG/1
50 TABLET ORAL NIGHTLY
Status: CANCELLED | OUTPATIENT
Start: 2024-02-03

## 2024-02-03 RX ORDER — BENZTROPINE MESYLATE 1 MG/1
1 TABLET ORAL 2 TIMES DAILY
Status: CANCELLED | OUTPATIENT
Start: 2024-02-03

## 2024-02-03 RX ORDER — TRAMADOL HYDROCHLORIDE 50 MG/1
50 TABLET ORAL EVERY 6 HOURS PRN
Status: DISCONTINUED | OUTPATIENT
Start: 2024-02-03 | End: 2024-02-09 | Stop reason: HOSPADM

## 2024-02-03 RX ORDER — GABAPENTIN 100 MG/1
100 CAPSULE ORAL 2 TIMES DAILY
Status: DISCONTINUED | OUTPATIENT
Start: 2024-02-03 | End: 2024-02-04

## 2024-02-03 RX ADMIN — LISINOPRIL 30 MG: 20 TABLET ORAL at 10:44

## 2024-02-03 RX ADMIN — LORAZEPAM 1 MG: 1 TABLET ORAL at 00:00

## 2024-02-03 RX ADMIN — TRAMADOL HYDROCHLORIDE 50 MG: 50 TABLET ORAL at 14:54

## 2024-02-03 RX ADMIN — GABAPENTIN 100 MG: 100 CAPSULE ORAL at 10:44

## 2024-02-03 RX ADMIN — DULOXETINE HYDROCHLORIDE 20 MG: 20 CAPSULE, DELAYED RELEASE ORAL at 10:45

## 2024-02-03 RX ADMIN — AMLODIPINE BESYLATE 20 MG: 10 TABLET ORAL at 10:45

## 2024-02-03 RX ADMIN — DULOXETINE HYDROCHLORIDE 20 MG: 20 CAPSULE, DELAYED RELEASE ORAL at 00:00

## 2024-02-03 RX ADMIN — DULOXETINE HYDROCHLORIDE 20 MG: 20 CAPSULE, DELAYED RELEASE ORAL at 20:09

## 2024-02-03 RX ADMIN — LORAZEPAM 1 MG: 1 TABLET ORAL at 20:09

## 2024-02-03 RX ADMIN — GABAPENTIN 100 MG: 100 CAPSULE ORAL at 01:41

## 2024-02-03 RX ADMIN — GABAPENTIN 100 MG: 100 CAPSULE ORAL at 20:09

## 2024-02-03 RX ADMIN — Medication 500 MG: at 14:54

## 2024-02-03 RX ADMIN — QUETIAPINE FUMARATE 25 MG: 25 TABLET ORAL at 00:00

## 2024-02-03 RX ADMIN — LORAZEPAM 1 MG: 1 TABLET ORAL at 14:54

## 2024-02-03 RX ADMIN — QUETIAPINE FUMARATE 25 MG: 25 TABLET ORAL at 20:09

## 2024-02-03 RX ADMIN — TRAMADOL HYDROCHLORIDE 50 MG: 50 TABLET ORAL at 01:41

## 2024-02-03 RX ADMIN — FOLIC ACID 1 MG: 1 TABLET ORAL at 14:54

## 2024-02-03 RX ADMIN — LORAZEPAM 1 MG: 1 TABLET ORAL at 10:44

## 2024-02-03 RX ADMIN — ACETAMINOPHEN 325MG 650 MG: 325 TABLET ORAL at 01:41

## 2024-02-03 RX ADMIN — ATORVASTATIN CALCIUM 10 MG: 10 TABLET, FILM COATED ORAL at 20:09

## 2024-02-03 ASSESSMENT — PAIN SCALES - GENERAL
PAINLEVEL_OUTOF10: 2
PAINLEVEL_OUTOF10: 5
PAINLEVEL_OUTOF10: 8
PAINLEVEL_OUTOF10: 6
PAINLEVEL_OUTOF10: 5

## 2024-02-03 ASSESSMENT — PAIN DESCRIPTION - LOCATION
LOCATION: BACK;KNEE
LOCATION: BACK
LOCATION: BACK

## 2024-02-03 ASSESSMENT — LIFESTYLE VARIABLES
HOW OFTEN DO YOU HAVE A DRINK CONTAINING ALCOHOL: 2-4 TIMES A MONTH
HOW MANY STANDARD DRINKS CONTAINING ALCOHOL DO YOU HAVE ON A TYPICAL DAY: 1 OR 2

## 2024-02-03 ASSESSMENT — PAIN DESCRIPTION - ORIENTATION: ORIENTATION: RIGHT;LEFT

## 2024-02-03 ASSESSMENT — SLEEP AND FATIGUE QUESTIONNAIRES
DO YOU USE A SLEEP AID: YES
DO YOU HAVE DIFFICULTY SLEEPING: YES
SLEEP PATTERN: DIFFICULTY FALLING ASLEEP;DISTURBED/INTERRUPTED SLEEP;EARLY AWAKENING;INSOMNIA
AVERAGE NUMBER OF SLEEP HOURS: 3

## 2024-02-03 ASSESSMENT — PAIN DESCRIPTION - DESCRIPTORS: DESCRIPTORS: ACHING
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
## 2024-02-03 NOTE — PLAN OF CARE
Problem: Anxiety  Goal: Will report anxiety at manageable levels  Description: INTERVENTIONS:  1. Administer medication as ordered  2. Teach and rehearse alternative coping skills  3. Provide emotional support with 1:1 interaction with staff  Outcome: Progressing     Problem: Coping  Goal: Pt/Family able to verbalize concerns and demonstrate effective coping strategies  Description: INTERVENTIONS:  1. Assist patient/family to identify coping skills, available support systems and cultural and spiritual values  2. Provide emotional support, including active listening and acknowledgement of concerns of patient and caregivers  3. Reduce environmental stimuli, as able  4. Instruct patient/family in relaxation techniques, as appropriate  5. Assess for spiritual pain/suffering and initiate Spiritual Care, Psychosocial Clinical Specialist consults as needed  Outcome: Progressing     Problem: Pain  Goal: Verbalizes/displays adequate comfort level or baseline comfort level  2/3/2024 1813 by Gabriel Leonard RN  Flowsheets (Taken 2/3/2024 1813)  Verbalizes/displays adequate comfort level or baseline comfort level: Encourage patient to monitor pain and request assistance  2/3/2024 1812 by Gabriel Leonard RN  Outcome: Progressing

## 2024-02-04 LAB
EST. AVERAGE GLUCOSE BLD GHB EST-MCNC: 114 MG/DL
HBA1C MFR BLD: 5.6 % (ref 4.2–5.6)

## 2024-02-04 PROCEDURE — 99222 1ST HOSP IP/OBS MODERATE 55: CPT | Performed by: PSYCHIATRY & NEUROLOGY

## 2024-02-04 PROCEDURE — 36415 COLL VENOUS BLD VENIPUNCTURE: CPT

## 2024-02-04 PROCEDURE — 1240000000 HC EMOTIONAL WELLNESS R&B

## 2024-02-04 PROCEDURE — 6370000000 HC RX 637 (ALT 250 FOR IP): Performed by: STUDENT IN AN ORGANIZED HEALTH CARE EDUCATION/TRAINING PROGRAM

## 2024-02-04 PROCEDURE — 83036 HEMOGLOBIN GLYCOSYLATED A1C: CPT

## 2024-02-04 PROCEDURE — 6370000000 HC RX 637 (ALT 250 FOR IP): Performed by: PSYCHIATRY & NEUROLOGY

## 2024-02-04 PROCEDURE — 6370000000 HC RX 637 (ALT 250 FOR IP): Performed by: EMERGENCY MEDICINE

## 2024-02-04 RX ORDER — GABAPENTIN 300 MG/1
600 CAPSULE ORAL 3 TIMES DAILY
Status: DISCONTINUED | OUTPATIENT
Start: 2024-02-04 | End: 2024-02-09 | Stop reason: HOSPADM

## 2024-02-04 RX ORDER — DIPHENHYDRAMINE HCL 25 MG
50 CAPSULE ORAL NIGHTLY PRN
Status: DISCONTINUED | OUTPATIENT
Start: 2024-02-04 | End: 2024-02-09 | Stop reason: HOSPADM

## 2024-02-04 RX ORDER — BUSPIRONE HYDROCHLORIDE 5 MG/1
5 TABLET ORAL 2 TIMES DAILY
Status: DISCONTINUED | OUTPATIENT
Start: 2024-02-04 | End: 2024-02-09

## 2024-02-04 RX ORDER — DIPHENHYDRAMINE HCL 25 MG
25 CAPSULE ORAL EVERY 8 HOURS PRN
Status: DISCONTINUED | OUTPATIENT
Start: 2024-02-04 | End: 2024-02-09 | Stop reason: HOSPADM

## 2024-02-04 RX ADMIN — FOLIC ACID 1 MG: 1 TABLET ORAL at 08:15

## 2024-02-04 RX ADMIN — LORAZEPAM 1 MG: 1 TABLET ORAL at 13:28

## 2024-02-04 RX ADMIN — DIPHENHYDRAMINE HYDROCHLORIDE 50 MG: 25 CAPSULE ORAL at 20:07

## 2024-02-04 RX ADMIN — BUSPIRONE HYDROCHLORIDE 5 MG: 5 TABLET ORAL at 13:28

## 2024-02-04 RX ADMIN — Medication 500 MG: at 08:16

## 2024-02-04 RX ADMIN — LORAZEPAM 1 MG: 1 TABLET ORAL at 20:02

## 2024-02-04 RX ADMIN — ATORVASTATIN CALCIUM 10 MG: 10 TABLET, FILM COATED ORAL at 20:02

## 2024-02-04 RX ADMIN — BUSPIRONE HYDROCHLORIDE 5 MG: 5 TABLET ORAL at 20:02

## 2024-02-04 RX ADMIN — DIPHENHYDRAMINE HYDROCHLORIDE 25 MG: 25 CAPSULE ORAL at 13:35

## 2024-02-04 RX ADMIN — GABAPENTIN 600 MG: 300 CAPSULE ORAL at 20:02

## 2024-02-04 RX ADMIN — LORAZEPAM 1 MG: 1 TABLET ORAL at 08:14

## 2024-02-04 RX ADMIN — DULOXETINE HYDROCHLORIDE 20 MG: 20 CAPSULE, DELAYED RELEASE ORAL at 08:15

## 2024-02-04 RX ADMIN — ACETAMINOPHEN 325MG 650 MG: 325 TABLET ORAL at 13:32

## 2024-02-04 RX ADMIN — AMLODIPINE BESYLATE 20 MG: 10 TABLET ORAL at 08:15

## 2024-02-04 RX ADMIN — TRAMADOL HYDROCHLORIDE 50 MG: 50 TABLET ORAL at 08:15

## 2024-02-04 RX ADMIN — GABAPENTIN 100 MG: 100 CAPSULE ORAL at 08:15

## 2024-02-04 RX ADMIN — TRAMADOL HYDROCHLORIDE 50 MG: 50 TABLET ORAL at 13:32

## 2024-02-04 ASSESSMENT — PAIN SCALES - GENERAL
PAINLEVEL_OUTOF10: 5
PAINLEVEL_OUTOF10: 2
PAINLEVEL_OUTOF10: 7

## 2024-02-04 NOTE — PLAN OF CARE
Problem: Pain  Goal: Verbalizes/displays adequate comfort level or baseline comfort level  Outcome: Progressing  Flowsheets (Taken 2/3/2024 1813)  Verbalizes/displays adequate comfort level or baseline comfort level: Encourage patient to monitor pain and request assistance     Problem: Anxiety  Goal: Will report anxiety at manageable levels  Description: INTERVENTIONS:  1. Administer medication as ordered  2. Teach and rehearse alternative coping skills  3. Provide emotional support with 1:1 interaction with staff  Outcome: Progressing     Problem: Coping  Goal: Pt/Family able to verbalize concerns and demonstrate effective coping strategies  Description: INTERVENTIONS:  1. Assist patient/family to identify coping skills, available support systems and cultural and spiritual values  2. Provide emotional support, including active listening and acknowledgement of concerns of patient and caregivers  3. Reduce environmental stimuli, as able  4. Instruct patient/family in relaxation techniques, as appropriate  5. Assess for spiritual pain/suffering and initiate Spiritual Care, Psychosocial Clinical Specialist consults as needed  Outcome: Progressing

## 2024-02-04 NOTE — BH NOTE
2053- pt has been observed isolated to self in room/bed reading.  Pt with sad affect, but smiled appropriately.  Denied si/hi/avh during time of assessment.  Remains compliant with meds.  Will continue to monitor and support as needed.     0617- pt slept about 10.25 hours.

## 2024-02-05 LAB
GLUCOSE BLD STRIP.AUTO-MCNC: 109 MG/DL (ref 70–110)
GLUCOSE BLD STRIP.AUTO-MCNC: 180 MG/DL (ref 70–110)
GLUCOSE BLD STRIP.AUTO-MCNC: 92 MG/DL (ref 70–110)

## 2024-02-05 PROCEDURE — 6370000000 HC RX 637 (ALT 250 FOR IP): Performed by: EMERGENCY MEDICINE

## 2024-02-05 PROCEDURE — 99232 SBSQ HOSP IP/OBS MODERATE 35: CPT | Performed by: PSYCHIATRY & NEUROLOGY

## 2024-02-05 PROCEDURE — 1240000000 HC EMOTIONAL WELLNESS R&B

## 2024-02-05 PROCEDURE — 6370000000 HC RX 637 (ALT 250 FOR IP): Performed by: PSYCHIATRY & NEUROLOGY

## 2024-02-05 PROCEDURE — 6370000000 HC RX 637 (ALT 250 FOR IP): Performed by: STUDENT IN AN ORGANIZED HEALTH CARE EDUCATION/TRAINING PROGRAM

## 2024-02-05 PROCEDURE — 6370000000 HC RX 637 (ALT 250 FOR IP)

## 2024-02-05 PROCEDURE — 82962 GLUCOSE BLOOD TEST: CPT

## 2024-02-05 RX ORDER — AMLODIPINE BESYLATE 10 MG/1
10 TABLET ORAL DAILY
Status: DISCONTINUED | OUTPATIENT
Start: 2024-02-05 | End: 2024-02-09 | Stop reason: HOSPADM

## 2024-02-05 RX ORDER — LANOLIN ALCOHOL/MO/W.PET/CERES
1 CREAM (GRAM) TOPICAL 2 TIMES DAILY PRN
Status: DISCONTINUED | OUTPATIENT
Start: 2024-02-05 | End: 2024-02-09 | Stop reason: HOSPADM

## 2024-02-05 RX ORDER — FERROUS SULFATE 325(65) MG
325 TABLET ORAL
Status: DISCONTINUED | OUTPATIENT
Start: 2024-02-06 | End: 2024-02-09 | Stop reason: HOSPADM

## 2024-02-05 RX ORDER — LANOLIN ALCOHOL/MO/W.PET/CERES
CREAM (GRAM) TOPICAL 4 TIMES DAILY
Status: DISCONTINUED | OUTPATIENT
Start: 2024-02-05 | End: 2024-02-05

## 2024-02-05 RX ORDER — BENZOCAINE/MENTHOL 6 MG-10 MG
LOZENGE MUCOUS MEMBRANE 2 TIMES DAILY
Status: DISCONTINUED | OUTPATIENT
Start: 2024-02-05 | End: 2024-02-09 | Stop reason: HOSPADM

## 2024-02-05 RX ORDER — LISINOPRIL 20 MG/1
20 TABLET ORAL DAILY
Status: DISCONTINUED | OUTPATIENT
Start: 2024-02-05 | End: 2024-02-09 | Stop reason: HOSPADM

## 2024-02-05 RX ADMIN — Medication 500 MG: at 08:57

## 2024-02-05 RX ADMIN — PETROLATUM: 420 OINTMENT TOPICAL at 12:32

## 2024-02-05 RX ADMIN — GABAPENTIN 600 MG: 300 CAPSULE ORAL at 08:59

## 2024-02-05 RX ADMIN — MELOXICAM 7.5 MG: 7.5 TABLET ORAL at 08:58

## 2024-02-05 RX ADMIN — LORAZEPAM 1 MG: 1 TABLET ORAL at 20:31

## 2024-02-05 RX ADMIN — TRAMADOL HYDROCHLORIDE 50 MG: 50 TABLET ORAL at 12:20

## 2024-02-05 RX ADMIN — LORAZEPAM 1 MG: 1 TABLET ORAL at 13:49

## 2024-02-05 RX ADMIN — GABAPENTIN 600 MG: 300 CAPSULE ORAL at 13:49

## 2024-02-05 RX ADMIN — BUSPIRONE HYDROCHLORIDE 5 MG: 5 TABLET ORAL at 08:59

## 2024-02-05 RX ADMIN — FOLIC ACID 1 MG: 1 TABLET ORAL at 08:58

## 2024-02-05 RX ADMIN — ACETAMINOPHEN 325MG 650 MG: 325 TABLET ORAL at 12:19

## 2024-02-05 RX ADMIN — AMLODIPINE BESYLATE 10 MG: 10 TABLET ORAL at 08:58

## 2024-02-05 RX ADMIN — LORAZEPAM 1 MG: 1 TABLET ORAL at 08:59

## 2024-02-05 RX ADMIN — LISINOPRIL 20 MG: 20 TABLET ORAL at 11:04

## 2024-02-05 RX ADMIN — CITALOPRAM HYDROBROMIDE 30 MG: 20 TABLET ORAL at 08:58

## 2024-02-05 RX ADMIN — ACETAMINOPHEN 325MG 650 MG: 325 TABLET ORAL at 20:49

## 2024-02-05 RX ADMIN — BUSPIRONE HYDROCHLORIDE 5 MG: 5 TABLET ORAL at 20:31

## 2024-02-05 RX ADMIN — TRAMADOL HYDROCHLORIDE 50 MG: 50 TABLET ORAL at 20:49

## 2024-02-05 RX ADMIN — GABAPENTIN 600 MG: 300 CAPSULE ORAL at 20:31

## 2024-02-05 RX ADMIN — ATORVASTATIN CALCIUM 10 MG: 10 TABLET, FILM COATED ORAL at 20:31

## 2024-02-05 ASSESSMENT — PAIN DESCRIPTION - ORIENTATION: ORIENTATION: RIGHT;ANTERIOR;POSTERIOR

## 2024-02-05 ASSESSMENT — PAIN DESCRIPTION - DESCRIPTORS: DESCRIPTORS: ACHING

## 2024-02-05 ASSESSMENT — PAIN SCALES - GENERAL: PAINLEVEL_OUTOF10: 9

## 2024-02-05 NOTE — GROUP NOTE
Art Therapy Group Progress Note    PATIENT SCHEDULED FOR GROUP AT:  1:00 PM    GROUP STOP TIME:  1:45 PM     ATTENDANCE: Low (4/12 participants)     TOPIC / FOCUS:  How I feel Today     GOALS:  practice guided meditation, identify current emotional state, identify somatic experience of emotions, increase emotional communication skills, identify positive coping skills to help manage or support emotions     PARTICIPATION LEVEL:  Declined    Jorge A Nava  Art Therapist, MA, ATR-P  Provisional Registered Art Therapist

## 2024-02-05 NOTE — CONSULTS
[facility]  FAMILY MEDICINE CONSULT NOTE FOR BEHAVIORAL HEALTH UNIT    Patient:    Nanci Patrick , 43 y.o. female   Room/Bed:  122/02  Admission Date:   2/2/2024  Code status:  Prior      Reason for Consult: Suicide Attempt    ASSESSMENT:  Nanci Patrick is a 43 y.o. female w/ a PMH of HTN, HLD, anxiety and depression presenting for suicide attempt with a gun who is now admitted to the [facility] Behavioral Health Unit.    Nurse Chaperone during History and Physical:     PLAN:    MDD  MORGAN  Suicide Attempt with a Gun  -Celexa 30 mg  -Buspar 5mg  -Ativan 1mg TID PRN  -Management per inpatient psychiatry    Dermatitis, Unspecified  Ddx Atopic Dermatitis vs psoriasis vs. Xeroderma, involving L and R arms  -Aquaphor BID  -plan for dermatology referral outpatient     Chronic Low Back Pain  -Meloxicam QD     Anemia  -CBC monitored outpatient     HTN  HLD  -Home amlodipine  -Home Lisinopril  -Home atorvastatin     Anxiety   Depression  -Home citalopram  -Home ativan  -Now on Buspar    Global  Diet: Regular  Mobility: As tolerated    Thank you for this consult.       SUBJECTIVE:   Dr. Toney has consulted Family Medicine to evaluate Nanci Patrick  in the Emergency Department. She  is a 43 y.o. female w/ PMHx of HTN, HLD, Depression and anxiety presenting for suicide attempt who is now admitted to the [facility] Behavioral Health Unit.     ED Course:  -Vitals: VSS  -Labs: wnl  -Imaging: none  -EKG: wnl  -Meds: none  -IVF: none  -Procedures: none  -Consults:FM    CURRENT MEDICATIONS:  Current Facility-Administered Medications   Medication Dose Route Frequency Provider Last Rate Last Admin    amLODIPine (NORVASC) tablet 10 mg  10 mg Oral Daily Dallin Conteh DO        busPIRone (BUSPAR) tablet 5 mg  5 mg Oral BID Anh Jiménez MD   5 mg at 02/04/24 2002    citalopram (CELEXA) tablet 30 mg  30 mg Oral Daily Anh Jiménez MD        diphenhydrAMINE (BENADRYL) capsule 25 mg  25 mg Oral Q8H PRN Jessy  Out of Food in the Last Year: Often true   Transportation Needs: Unmet Transportation Needs (2/3/2024)    PRAPARE - Transportation     Lack of Transportation (Medical): Yes     Lack of Transportation (Non-Medical): Yes   Housing Stability: High Risk (2/3/2024)    Housing Stability Vital Sign     Unable to Pay for Housing in the Last Year: No     Number of Places Lived in the Last Year: 4     Unstable Housing in the Last Year: No       Allergies   Allergen Reactions    [place] Nut (Berthollefia Excelsa) Skin Test Anaphylaxis    Cashew Nut (Anacardium Occidentale) Skin Test Anaphylaxis    Hazelnut Anaphylaxis    Macadamia Nut Oil Anaphylaxis    [place] Nut (Diagnostic) Anaphylaxis    Penicillins Hives     Other reaction(s): Unable to Obtain; tolerates meropenem    Shellfish Allergy      Other reaction(s): Unable to Obtain    Sulfa Antibiotics      Other reaction(s): Unknown (comments)  Causing severe  Headaches           OBJECTIVE:   VITALS  Patient Vitals for the past 24 hrs:   Temp Pulse Resp BP SpO2   02/05/24 0759 98.3 °F (36.8 °C) 90 18 (!) 155/80 96 %   02/04/24 2017 97.3 °F (36.3 °C) 79 18 (!) 152/85 96 %       PHYSICAL EXAM  General: in no apparent distress.  HEENT: NCAT, PERRLA, EOM intact; oral mucosa well perfused, oropharynx clear  Neck: supple, normal ROM  CVS: regular rate and rhythm, S1, S2 normal, no murmur, click, rub or gallop  Lungs: chest clear, no wheezing, rales, normal symmetric air entry    Abdomen: Soft, non-distended, non-TTP, no organomegaly, no masses  Ext: No calf tenderness, peripheral pulses present, no significant edema.  Skin: warm, dry, intact, no significant rashes/petechia/ecchymosis appreciated  Neuro: No focal neurologic deficits or gross abnormalities  Psych: A&Ox3, appropriate mood and affect    LABS, IMAGING, AND DIAGNOSTIC STUDIES  Recent Results (from the past 24 hour(s))   Hemoglobin A1C    Collection Time: 02/04/24  5:23 PM   Result Value Ref Range    Hemoglobin A1C 5.6

## 2024-02-05 NOTE — GROUP NOTE
Group Therapy Note    Date: 2/5/2024    Group Start Time: 1400  Group End Time: 1435  Group Topic: Psychoeducation        Honey Orosco        Group Therapy Note    Attendees: 2  Group Topic-Self Care tips to boost your mental health     Patient declined to participate  Discipline Responsible: /Counselor      Signature:  Honey Orosco

## 2024-02-05 NOTE — BH NOTE
2052- pt has been observed to be isolated to self in bed reading.  Very pleasant upon approach.  During 1:1 pt reported that she had a \"good day.\"  Endorsed depression and fleeting thoughts of si.  Contracted for safety.  Denied avh during time of assessment.  Remains compliant with meds and education provided on med changes.  Prn meds provided per pt request for c/o insomnia and itching.  All needs assessed and met.  Will continue to monitor and support as needed.

## 2024-02-05 NOTE — GROUP NOTE
Group Therapy Note    Date: 2/5/2024    Group Start Time: 0930  Group End Time: 1015  Group Topic: Recreational    [facility]    Sheila Silvestre        Group Therapy Note    Attendees: 7/11     Group type: Exercise      Group Focus: This recreational group engaged patients in physical activity.  Recreational therapist lead group members in guided exercises. Patients discussed different physical activities and relaxation techniques they may use or can add to their daily routines. This group may help reduce feelings of depression and stress and enhance mood and over emotional well-being.            Notes: Patient did not attend group    Recreational Therapist  Sheila Silvestre
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
## 2024-02-05 NOTE — PLAN OF CARE
Problem: Coping  Goal: Pt/Family able to verbalize concerns and demonstrate effective coping strategies  Description: INTERVENTIONS:  1. Assist patient/family to identify coping skills, available support systems and cultural and spiritual values  2. Provide emotional support, including active listening and acknowledgement of concerns of patient and caregivers  3. Reduce environmental stimuli, as able  4. Instruct patient/family in relaxation techniques, as appropriate  5. Assess for spiritual pain/suffering and initiate Spiritual Care, Psychosocial Clinical Specialist consults as needed  Outcome: Progressing     Problem: Depression/Self Harm  Goal: Effect of psychiatric condition will be minimized and patient will be protected from self harm  Description: INTERVENTIONS:  1. Assess impact of patient's symptoms on level of functioning, self care needs and offer support as indicated  2. Assess patient/family knowledge of depression, impact on illness and need for teaching  3. Provide emotional support, presence and reassurance  4. Assess for possible suicidal thoughts or ideation. If patient expresses suicidal thoughts or statements do not leave alone, initiate Suicide Precautions, move to a room close to the nursing station and obtain sitter  5. Initiate consults as appropriate with Mental Health Professional, Spiritual Care, Psychosocial CNS, and consider a recommendation to the LIP for a Psychiatric Consultation  Outcome: Progressing      Patient received alert and verbal, no c/o pain, am assessment was completed, patient denies SI, HI and AVH.  Patient is eating well.  Patient is sleeping well.  Blood sugars monitored ac and hs.  Patient is compliant with taking medications.  Patient is isolative to her room, affect flat, orders changed on eczema treatment today.  Will continue to encourage and educate patient on their individual treatment plan.  Will also continue to monitor mood and behavior and report

## 2024-02-05 NOTE — GROUP NOTE
Group Therapy Note    Date: 2/5/2024    Group Start Time: 1500  Group End Time: 1555  Group Topic: Music Therapy      Vernell Calles        Group Therapy Note    Attendees: 4/12    Group Focus: Music therapy group explored the topics of purpose, meaning, and values through song montserrat analysis of two songs and an ACT values checklist. Group members also discussed themes including identifying emotions, discussion of not being taken seriously, finding happiness, and identifying and sharing of values. The group may also promote self-awareness, self-reflection, and use of music as a coping skill.       Notes:  Patient did not attend group.      Discipline Responsible: /Counselor      Signature:  TAMY Serrano, LPC  Board-Certified Music Therapist  Licensed Professional Counselor

## 2024-02-06 LAB
GLUCOSE BLD STRIP.AUTO-MCNC: 109 MG/DL (ref 70–110)
GLUCOSE BLD STRIP.AUTO-MCNC: 114 MG/DL (ref 70–110)
GLUCOSE BLD STRIP.AUTO-MCNC: 117 MG/DL (ref 70–110)
GLUCOSE BLD STRIP.AUTO-MCNC: 120 MG/DL (ref 70–110)

## 2024-02-06 PROCEDURE — 6370000000 HC RX 637 (ALT 250 FOR IP): Performed by: PSYCHIATRY & NEUROLOGY

## 2024-02-06 PROCEDURE — 1240000000 HC EMOTIONAL WELLNESS R&B

## 2024-02-06 PROCEDURE — 6370000000 HC RX 637 (ALT 250 FOR IP): Performed by: STUDENT IN AN ORGANIZED HEALTH CARE EDUCATION/TRAINING PROGRAM

## 2024-02-06 PROCEDURE — 82962 GLUCOSE BLOOD TEST: CPT

## 2024-02-06 PROCEDURE — 6370000000 HC RX 637 (ALT 250 FOR IP): Performed by: EMERGENCY MEDICINE

## 2024-02-06 PROCEDURE — 6370000000 HC RX 637 (ALT 250 FOR IP)

## 2024-02-06 PROCEDURE — 99231 SBSQ HOSP IP/OBS SF/LOW 25: CPT | Performed by: PSYCHIATRY & NEUROLOGY

## 2024-02-06 RX ORDER — LORAZEPAM 0.5 MG/1
0.5 TABLET ORAL 3 TIMES DAILY
Status: DISCONTINUED | OUTPATIENT
Start: 2024-02-06 | End: 2024-02-09 | Stop reason: HOSPADM

## 2024-02-06 RX ADMIN — GABAPENTIN 600 MG: 300 CAPSULE ORAL at 14:41

## 2024-02-06 RX ADMIN — Medication 500 MG: at 08:33

## 2024-02-06 RX ADMIN — LORAZEPAM 0.5 MG: 0.5 TABLET ORAL at 14:44

## 2024-02-06 RX ADMIN — AMLODIPINE BESYLATE 10 MG: 10 TABLET ORAL at 08:34

## 2024-02-06 RX ADMIN — TRAMADOL HYDROCHLORIDE 50 MG: 50 TABLET ORAL at 16:37

## 2024-02-06 RX ADMIN — DIPHENHYDRAMINE HYDROCHLORIDE 50 MG: 25 CAPSULE ORAL at 22:52

## 2024-02-06 RX ADMIN — GABAPENTIN 600 MG: 300 CAPSULE ORAL at 21:00

## 2024-02-06 RX ADMIN — Medication: at 20:24

## 2024-02-06 RX ADMIN — CITALOPRAM HYDROBROMIDE 30 MG: 20 TABLET ORAL at 08:35

## 2024-02-06 RX ADMIN — ACETAMINOPHEN 325MG 650 MG: 325 TABLET ORAL at 16:37

## 2024-02-06 RX ADMIN — ACETAMINOPHEN 325MG 650 MG: 325 TABLET ORAL at 09:26

## 2024-02-06 RX ADMIN — DIPHENHYDRAMINE HYDROCHLORIDE 25 MG: 25 CAPSULE ORAL at 16:37

## 2024-02-06 RX ADMIN — TRAMADOL HYDROCHLORIDE 50 MG: 50 TABLET ORAL at 09:26

## 2024-02-06 RX ADMIN — LISINOPRIL 20 MG: 20 TABLET ORAL at 08:35

## 2024-02-06 RX ADMIN — BUSPIRONE HYDROCHLORIDE 5 MG: 5 TABLET ORAL at 20:25

## 2024-02-06 RX ADMIN — ACETAMINOPHEN 325MG 650 MG: 325 TABLET ORAL at 22:52

## 2024-02-06 RX ADMIN — ATORVASTATIN CALCIUM 10 MG: 10 TABLET, FILM COATED ORAL at 20:25

## 2024-02-06 RX ADMIN — HYDROCORTISONE ACETATE: 1 CREAM TOPICAL at 20:24

## 2024-02-06 RX ADMIN — FERROUS SULFATE TAB 325 MG (65 MG ELEMENTAL FE) 325 MG: 325 (65 FE) TAB at 08:33

## 2024-02-06 RX ADMIN — FOLIC ACID 1 MG: 1 TABLET ORAL at 08:34

## 2024-02-06 RX ADMIN — LORAZEPAM 1 MG: 1 TABLET ORAL at 08:36

## 2024-02-06 RX ADMIN — HYDROCORTISONE ACETATE: 1 CREAM TOPICAL at 08:39

## 2024-02-06 RX ADMIN — BUSPIRONE HYDROCHLORIDE 5 MG: 5 TABLET ORAL at 08:36

## 2024-02-06 RX ADMIN — LORAZEPAM 0.5 MG: 0.5 TABLET ORAL at 20:24

## 2024-02-06 RX ADMIN — GABAPENTIN 600 MG: 300 CAPSULE ORAL at 08:33

## 2024-02-06 RX ADMIN — TRAMADOL HYDROCHLORIDE 50 MG: 50 TABLET ORAL at 22:52

## 2024-02-06 ASSESSMENT — PAIN DESCRIPTION - DESCRIPTORS
DESCRIPTORS: ACHING
DESCRIPTORS: ACHING

## 2024-02-06 ASSESSMENT — PAIN DESCRIPTION - ORIENTATION
ORIENTATION: RIGHT;ANTERIOR
ORIENTATION: LOWER
ORIENTATION: LOWER

## 2024-02-06 ASSESSMENT — PAIN DESCRIPTION - LOCATION
LOCATION: KNEE
LOCATION: GENERALIZED
LOCATION: KNEE

## 2024-02-06 ASSESSMENT — PAIN SCALES - GENERAL
PAINLEVEL_OUTOF10: 5
PAINLEVEL_OUTOF10: 7
PAINLEVEL_OUTOF10: 1
PAINLEVEL_OUTOF10: 0
PAINLEVEL_OUTOF10: 0

## 2024-02-06 ASSESSMENT — PAIN - FUNCTIONAL ASSESSMENT
PAIN_FUNCTIONAL_ASSESSMENT: ACTIVITIES ARE NOT PREVENTED
PAIN_FUNCTIONAL_ASSESSMENT: ACTIVITIES ARE NOT PREVENTED

## 2024-02-06 NOTE — GROUP NOTE
Group Therapy Note    Date: 2/6/2024    Group Start Time: 0930  Group End Time: 1015  Group Topic: Music Therapy      StevanVernell        Group Therapy Note    Attendees: 9/15    Group Focus: Music therapy group explored healthy and unhealthy coping skills through the intervention of montserrat analysis. Therapist also provided psychoeducation on deep breathing techniques, and group members explored several breathing techniques with and without music. The group may promote stress reduction, self-expression, and use of music and breathing techniques as coping skills.         Notes:  Pt reported feeling \"pretty good\" at the start of group. Pt identified several healthy and unhealthy coping skills within song lyrics and discussed how people have to live their life moving forward rather than looking back. Pt reported that the breathing techniques practiced in group were soothing to her. Pt stated that this morning her mind was focused on a lot of different things and while doing one of the breathing techniques, she was not focused on all of those other things, and only focused on the music and breathing.    Status After Intervention:  Improved    Participation Level: Interactive    Participation Quality: Appropriate, Attentive, and Sharing      Speech:  normal      Thought Process/Content: Logical      Affective Functioning: Congruent      Mood: euthymic      Level of consciousness:  Alert and Attentive      Response to Learning: Able to verbalize current knowledge/experience, Able to verbalize/acknowledge new learning, Able to retain information, and Capable of insight      Endings: None Reported    Modes of Intervention: Education, Support, Socialization, Exploration, and Media      Discipline Responsible: /Counselor      Signature:  TAMY Serrano, LPC  Board-Certified Music Therapist  Licensed Professional Counselor
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
## 2024-02-06 NOTE — BSMART NOTE
SOCIAL WORK GROUP THERAPY  NOTE           Date: February 6, 2024.     Group Time:  12:00 PM      Group Ended 12:45      [facility] 1 Special Unit 1     Group Topic: Stress    Group members Participation:       Patient Refused to Participate in Group session today but was encouraged to Contribute.      Group Session: Anger Management is a system of psychological therapeutic technique that teaches  patients that have excessive or uncontrollable anger and aggression problems. Foundationally,  how to reduce the triggers, and effects of an individual without control  of their anger and emotional stated.    Crissy Santizo, , MSW. Supervisee

## 2024-02-06 NOTE — PLAN OF CARE
Problem: Coping  Goal: Pt/Family able to verbalize concerns and demonstrate effective coping strategies  Description: INTERVENTIONS:  1. Assist patient/family to identify coping skills, available support systems and cultural and spiritual values  2. Provide emotional support, including active listening and acknowledgement of concerns of patient and caregivers  3. Reduce environmental stimuli, as able  4. Instruct patient/family in relaxation techniques, as appropriate  5. Assess for spiritual pain/suffering and initiate Spiritual Care, Psychosocial Clinical Specialist consults as needed  2/6/2024 1156 by Gina Kaur RN  Outcome: Progressing  2/5/2024 2224 by Sandra Zacarias, RN  Outcome: Progressing     Problem: Decision Making  Goal: Pt/Family able to effectively weigh alternatives and participate in decision making related to treatment and care  Description: INTERVENTIONS:  1. Determine when there are differences between patient's view, family's view, and healthcare provider's view of condition  2. Facilitate patient and family articulation of goals for care  3. Help patient and family identify pros/cons of alternative solutions  4. Provide information as requested by patient/family  5. Respect patient/family right to receive or not to receive information  6. Serve as a liaison between patient and family and health care team  7. Initiate Consults from Ethics, Palliative Care or initiate Family Care Conference as is appropriate  2/5/2024 2224 by Sandra Zacarias, RN  Outcome: Progressing     Patient recvd. alert and verbal, medicated for knee and back pain, with tylenol and tramadol x 2, patient denies SI, HI, and AVH, she has been attending group sessions and coming out of her room more, blood sugars have been stable today @ 114, 117, 109, no acute changes noted, patient eating well, will continue to monitor.

## 2024-02-06 NOTE — BH NOTE
Pt appeared to have slept for 6.25 hours thus far. Will continue to monitor for safety and changes in behavior.

## 2024-02-06 NOTE — PLAN OF CARE
Problem: ABCDS Injury Assessment  Goal: Absence of physical injury  Outcome: Progressing     Problem: Pain  Goal: Verbalizes/displays adequate comfort level or baseline comfort level  Outcome: Progressing     Problem: Anxiety  Goal: Will report anxiety at manageable levels  Description: INTERVENTIONS:  1. Administer medication as ordered  2. Teach and rehearse alternative coping skills  3. Provide emotional support with 1:1 interaction with staff  Outcome: Progressing     Problem: Coping  Goal: Pt/Family able to verbalize concerns and demonstrate effective coping strategies  Description: INTERVENTIONS:  1. Assist patient/family to identify coping skills, available support systems and cultural and spiritual values  2. Provide emotional support, including active listening and acknowledgement of concerns of patient and caregivers  3. Reduce environmental stimuli, as able  4. Instruct patient/family in relaxation techniques, as appropriate  5. Assess for spiritual pain/suffering and initiate Spiritual Care, Psychosocial Clinical Specialist consults as needed  2/5/2024 2224 by Sandra Zacarias, RN  Outcome: Progressing  2/5/2024 1848 by Gina Kaur, RN  Outcome: Progressing     Problem: Decision Making  Goal: Pt/Family able to effectively weigh alternatives and participate in decision making related to treatment and care  Description: INTERVENTIONS:  1. Determine when there are differences between patient's view, family's view, and healthcare provider's view of condition  2. Facilitate patient and family articulation of goals for care  3. Help patient and family identify pros/cons of alternative solutions  4. Provide information as requested by patient/family  5. Respect patient/family right to receive or not to receive information  6. Serve as a liaison between patient and family and health care team  7. Initiate Consults from Ethics, Palliative Care or initiate Family Care Conference as is appropriate  Outcome:  Progressing     Problem: Behavior  Goal: Pt/Family maintain appropriate behavior and adhere to behavioral management agreement, if implemented  Description: INTERVENTIONS:  1. Assess patient/family's coping skills and  non-compliant behavior (including use of illegal substances)  2. Notify security of behavior or suspected illegal substances which indicate the need for search of the family and/or belongings  3. Encourage verbalization of thoughts and concerns in a socially appropriate manner  4. Utilize positive, consistent limit setting strategies supporting safety of patient, staff and others  5. Encourage participation in the decision making process about the behavioral management agreement  6. If a visitor's behavior poses a threat to safety call refer to organization policy.  7. Initiate consult with , Psychosocial CNS, Spiritual Care as appropriate  Outcome: Progressing     Problem: Depression/Self Harm  Goal: Effect of psychiatric condition will be minimized and patient will be protected from self harm  Description: INTERVENTIONS:  1. Assess impact of patient's symptoms on level of functioning, self care needs and offer support as indicated  2. Assess patient/family knowledge of depression, impact on illness and need for teaching  3. Provide emotional support, presence and reassurance  4. Assess for possible suicidal thoughts or ideation. If patient expresses suicidal thoughts or statements do not leave alone, initiate Suicide Precautions, move to a room close to the nursing station and obtain sitter  5. Initiate consults as appropriate with Mental Health Professional, Spiritual Care, Psychosocial CNS, and consider a recommendation to the LIP for a Psychiatric Consultation  2/5/2024 2224 by Sandra Zacarias, RN  Outcome: Progressing  2/5/2024 1848 by Gina Kaur, RN  Outcome: Progressing     Problem: Spiritual Care  Goal: Pt/Family able to move forward in process of forgiving self, others, and/or

## 2024-02-06 NOTE — BSMART NOTE
SOCIAL WORK GROUP THERAPY  NOTE           Date: February 5, 2024.     Group Time:  12:00 PM      Group Ended 12:45      [facility] 1 Special Unit 1     Group Topic: Stress    Group members Participation: 3      Patient Refused to Participate in Group session today but was encouraged to Contribute.    Group session: There are diverse types of stress. A stressor may be a onetime occurrence, or it can be an occurrence that keeps happening over a  long period of time. Some people cope with stress more successfully and can recover from stressful event more fast than other. Everyone must deal with stress in their life, one way or another stressful event.    Crissy Santizo, , MSW. Supervisee

## 2024-02-06 NOTE — GROUP NOTE
Group Therapy Note    Date: 2/6/2024    Group Start Time: 1410  Group End Time: 1500  Group Topic: Recreational    [facility] ADULT    Sheila Silvestre        Group Therapy Note    Attendees: 7/16    Group Type: Feelings/Emotions Pictionary    Group Focus: Recreational group engaged patients in a group game that focused on emotions/feelings. Patients selected an emotion/feeling card and zehra a picture displaying the emotion. Group members took turns guessing the emotion/feeling portrayed in the picture. Group members were able to discuss a time they recently felt the emotion/feeling and how did they react.              Notes:  At the start of group patient reported she felt \"okay\". Patient shared being worrying about her housing situation and issues her son is having. Patient was able to list coping skills to help with anxiety. Patient expressed feeling calm when she is able to talk/spend time with her mother. Patient expressed unable to see the positive side of things, due things never working out. Patient left group at one point to speak with , and returned about 5 minutes before group ended.     Status After Intervention:  Unchanged    Participation Level: Active Listener and Interactive    Participation Quality: Appropriate, Attentive, and Sharing      Speech:  normal      Thought Process/Content: Logical      Affective Functioning: Congruent      Mood: euthymic      Level of consciousness:  Alert and Attentive      Response to Learning: Able to verbalize current knowledge/experience      Endings: None Reported    Modes of Intervention: Socialization, Problem-solving, and Activity      Recreational Therapist  Sheila Silvestre

## 2024-02-07 LAB
GLUCOSE BLD STRIP.AUTO-MCNC: 120 MG/DL (ref 70–110)
GLUCOSE BLD STRIP.AUTO-MCNC: 128 MG/DL (ref 70–110)
GLUCOSE BLD STRIP.AUTO-MCNC: 143 MG/DL (ref 70–110)

## 2024-02-07 PROCEDURE — 6370000000 HC RX 637 (ALT 250 FOR IP): Performed by: PSYCHIATRY & NEUROLOGY

## 2024-02-07 PROCEDURE — 82962 GLUCOSE BLOOD TEST: CPT

## 2024-02-07 PROCEDURE — 99232 SBSQ HOSP IP/OBS MODERATE 35: CPT | Performed by: PSYCHIATRY & NEUROLOGY

## 2024-02-07 PROCEDURE — 1240000000 HC EMOTIONAL WELLNESS R&B

## 2024-02-07 PROCEDURE — 6370000000 HC RX 637 (ALT 250 FOR IP): Performed by: EMERGENCY MEDICINE

## 2024-02-07 PROCEDURE — 6370000000 HC RX 637 (ALT 250 FOR IP): Performed by: STUDENT IN AN ORGANIZED HEALTH CARE EDUCATION/TRAINING PROGRAM

## 2024-02-07 PROCEDURE — 6370000000 HC RX 637 (ALT 250 FOR IP)

## 2024-02-07 RX ADMIN — ACETAMINOPHEN 325MG 650 MG: 325 TABLET ORAL at 18:25

## 2024-02-07 RX ADMIN — DIPHENHYDRAMINE HYDROCHLORIDE 50 MG: 25 CAPSULE ORAL at 20:28

## 2024-02-07 RX ADMIN — FERROUS SULFATE TAB 325 MG (65 MG ELEMENTAL FE) 325 MG: 325 (65 FE) TAB at 08:32

## 2024-02-07 RX ADMIN — HYDROCORTISONE ACETATE: 1 CREAM TOPICAL at 20:32

## 2024-02-07 RX ADMIN — GABAPENTIN 600 MG: 300 CAPSULE ORAL at 13:53

## 2024-02-07 RX ADMIN — GABAPENTIN 600 MG: 300 CAPSULE ORAL at 20:28

## 2024-02-07 RX ADMIN — BUSPIRONE HYDROCHLORIDE 5 MG: 5 TABLET ORAL at 08:31

## 2024-02-07 RX ADMIN — BUSPIRONE HYDROCHLORIDE 5 MG: 5 TABLET ORAL at 20:28

## 2024-02-07 RX ADMIN — TRAMADOL HYDROCHLORIDE 50 MG: 50 TABLET ORAL at 18:25

## 2024-02-07 RX ADMIN — Medication 1 APPLICATION: at 18:26

## 2024-02-07 RX ADMIN — AMLODIPINE BESYLATE 10 MG: 10 TABLET ORAL at 08:32

## 2024-02-07 RX ADMIN — LISINOPRIL 20 MG: 20 TABLET ORAL at 08:32

## 2024-02-07 RX ADMIN — LORAZEPAM 0.5 MG: 0.5 TABLET ORAL at 13:53

## 2024-02-07 RX ADMIN — TRAMADOL HYDROCHLORIDE 50 MG: 50 TABLET ORAL at 11:03

## 2024-02-07 RX ADMIN — GABAPENTIN 600 MG: 300 CAPSULE ORAL at 08:31

## 2024-02-07 RX ADMIN — CITALOPRAM HYDROBROMIDE 30 MG: 20 TABLET ORAL at 08:32

## 2024-02-07 RX ADMIN — ACETAMINOPHEN 325MG 650 MG: 325 TABLET ORAL at 11:03

## 2024-02-07 RX ADMIN — ATORVASTATIN CALCIUM 10 MG: 10 TABLET, FILM COATED ORAL at 20:28

## 2024-02-07 RX ADMIN — LORAZEPAM 0.5 MG: 0.5 TABLET ORAL at 08:32

## 2024-02-07 RX ADMIN — FOLIC ACID 1 MG: 1 TABLET ORAL at 08:32

## 2024-02-07 RX ADMIN — LORAZEPAM 0.5 MG: 0.5 TABLET ORAL at 20:28

## 2024-02-07 RX ADMIN — Medication 500 MG: at 08:31

## 2024-02-07 ASSESSMENT — PAIN DESCRIPTION - ORIENTATION
ORIENTATION: LOWER
ORIENTATION: LOWER

## 2024-02-07 ASSESSMENT — PAIN SCALES - GENERAL
PAINLEVEL_OUTOF10: 5
PAINLEVEL_OUTOF10: 2
PAINLEVEL_OUTOF10: 5

## 2024-02-07 ASSESSMENT — PAIN DESCRIPTION - DESCRIPTORS
DESCRIPTORS: ACHING
DESCRIPTORS: ACHING

## 2024-02-07 ASSESSMENT — PAIN DESCRIPTION - LOCATION
LOCATION: BACK
LOCATION: BACK

## 2024-02-07 NOTE — BSMART NOTE
SOCIAL WORK GROUP THERAPY  NOTE           Date: February 7, 2024.     Group Time:  12:00 PM      Group Ended 12:45      [facility] 1 Special Unit 1     Group Topic: Anger Management 1    Group members Participation: 4      Patient Actively Participate in Group session today.     Group Session: Anger Management is a system of psychological therapeutic technique that teaches  patients that have excessive or uncontrollable anger and aggression problems. Foundationally,  how to reduce the triggers, and effects of an individual without control  of their anger and emotional stated.    Crissy Santizo, , MSW. Supervisee

## 2024-02-07 NOTE — GROUP NOTE
Group Therapy Note    Date: 2/7/2024    Group Start Time: 1200  Group End Time: 1300  Group Topic: Music Therapy      Vernell Calles        Group Therapy Note    Attendees: 7/16    Group Focus: Music therapy group consisted of collaborative music making with a combination of group singing and instrumental improvisation. Group members selected songs from a song list which were played together live with instruments. Group members also discussed finding yourself, doing your best, and identified positive qualities about themselves. The group may promote improved mood, sense of self-efficacy, present-centered focus, and use of music as a coping skill.         Notes:  Patient reported feeling \"good\" at the start of group. Pt demonstrated bright affect and interacted socially with peers and staff in group. Pt discussed meanings of songs, identified positive qualities about herself, and engaged in music making with moderate energy. Based on her participation, pt seemed open to exploring use of music as a coping skill.    Status After Intervention:  Unchanged    Participation Level: Interactive    Participation Quality: Appropriate, Attentive, Sharing, and Supportive      Speech:  normal      Thought Process/Content: Logical      Affective Functioning: Congruent      Mood: euthymic      Level of consciousness:  Alert and Attentive      Response to Learning: Able to verbalize current knowledge/experience, Able to verbalize/acknowledge new learning, Able to retain information, and Capable of insight      Endings: None Reported    Modes of Intervention: Support, Socialization, Exploration, and Media      Discipline Responsible: /Counselor      Signature:  TAMY Serrano, LPC  Board-Certified Music Therapist  Licensed Professional Counselor

## 2024-02-07 NOTE — GROUP NOTE
Group Therapy Note    Date: 2/7/2024    Group Start Time: 1500  Group End Time: 1600  Group Topic: Recreational    [facility]    Sheila Silevstre        Group Therapy Note    Attendees: 8/15    Group Type: Jeopardy      Group Focus: Recreational therapy group engaged patients through a group game. RT placed patients into two teams to encourage team building. RT used topics that focused on identifying emotions, coping skills, and a variety of mental health related questions. The group can assist in increasing positive mood, self-care, social and problem-solving skills, and reduce stress.            Notes:  Patient discussed coping techniques she use, stating at times she counts numbers to help her relax, when feeling overwhelmed or upset. Patient expressed enjoying dancing as a form of exercise, and wanting start again. Patient displayed positive social interactions with group members.  At one point, patient left group to meet with , and returned about 10 minutes before group ended.     Status After Intervention:  Unchanged    Participation Level: Active Listener and Interactive    Participation Quality: Appropriate, Attentive, and Sharing      Speech:  normal      Thought Process/Content: Logical      Affective Functioning: Congruent      Mood: euthymic      Level of consciousness:  Alert and Attentive      Response to Learning: Able to verbalize current knowledge/experience      Endings: None Reported    Modes of Intervention: Socialization, Problem-solving, Activity, and Media      Recreational Therapist  Shiela Silvestre

## 2024-02-07 NOTE — PLAN OF CARE
Problem: Anxiety  Goal: Will report anxiety at manageable levels  Description: INTERVENTIONS:  1. Administer medication as ordered  2. Teach and rehearse alternative coping skills  3. Provide emotional support with 1:1 interaction with staff  2/7/2024 1211 by Linda Danielle RN  Outcome: Progressing  2/7/2024 0034 by Sandra Zacarias RN  Outcome: Progressing     Problem: Depression/Self Harm  Goal: Effect of psychiatric condition will be minimized and patient will be protected from self harm  Description: INTERVENTIONS:  1. Assess impact of patient's symptoms on level of functioning, self care needs and offer support as indicated  2. Assess patient/family knowledge of depression, impact on illness and need for teaching  3. Provide emotional support, presence and reassurance  4. Assess for possible suicidal thoughts or ideation. If patient expresses suicidal thoughts or statements do not leave alone, initiate Suicide Precautions, move to a room close to the nursing station and obtain sitter  5. Initiate consults as appropriate with Mental Health Professional, Spiritual Care, Psychosocial CNS, and consider a recommendation to the LIP for a Psychiatric Consultation  2/7/2024 1211 by Linda Danielle, RN  Outcome: Progressing  2/7/2024 0034 by Sandra Zacarias RN  Outcome: Progressing   Patient demonstrates a brightened affect, neutral mood. PT is less isolative, out in day room participating in activities. PT denies SI. States she is feeling much better. PT attended her treatment team this morning. PT treated for lower back pain per PT's request with PRN tramadol and tylenol at 11:06 am. Will continue to monitor.

## 2024-02-07 NOTE — BSMART NOTE
Summary: Patient came to [facility] with Suicidal ideations. Patient is a black 43-year-old female stating that she wants to end her life. Patient suffers with extremely anxiety, and depression. Patient went behind the hotel where she live with a gun, she took from her son's friends.    Assessment/Intervention: Patient's mood is depressed and Sad, eye contact-normal, insight-appropriate, thought process- normal and thought content-situation appropriated.    Treatment Team: Patient participated in Treatment Team meeting this morning with staff members. Patient came in with a big smile, with contentment, and gratitude for the help she is receiving from the hospital. Patient is looking forward  to her Mental Health Skill Building with the facility [facility]. Patient denies SI, and HI, AV Hallucinations.     [facility]. Address: [address] [place] [address] [place] [address] Phone # [phone], Fax # [phone].   Plan of Care:  encourage patient to  participate in comprehensive psychiatric services to promote mood stabilization and safety, and psychosocial therapy with individual or family counseling. SW. will continue to monitor patient progress and assist  as needed, along with assisting in patient discharge planning.     Crissy Santizo , MSW, Supervisee

## 2024-02-07 NOTE — BH NOTE
Received PT in room awake and sitting up in bed. PT was pleasant and cooperative with all turn over processes. PT is free from falls , self harm and the harm of others. Will continue to monitor.

## 2024-02-07 NOTE — GROUP NOTE
Art Therapy Group Progress Note    PATIENT SCHEDULED FOR GROUP AT:  1:00 PM    GROUP STOP TIME:  1:45 PM     ATTENDANCE: Low (6/16 participants)     TOPIC / FOCUS: Coping Skills Toolbox     GOALS:  identifying current coping skills, education on new coping skill strategies, increasing positive coping skill strategies, practicing breathing techniques, encourage safety planning, promote creative problem solving, encourage self-awareness       PARTICIPATION LEVEL:  active listener, interactive, participated in art      PARTICIPATION QUALITY:  appropriate, attentive, sharing     ATTENTION LEVEL: moderate investment, low energy, focused     LEVEL OF CONSCIOUSNESS: alert, oriented X 3      SPEECH: normal      THOUGHT PROCESS/ CONTENT: logical        AFFECTIVE FUNCTIONING: congruent     MOOD: euthymic     SYMBOLIC & THEMATIC CONTENT AS NOTED IN IMAGERY:  Pt identified talking, music, reading and using their tablet as their coping skills. Pt was able to add more coping skills to their toolbox during discussion, including playing with their pet cat, using essential oils, and journaling. Pt noted that they needed to expand and practice their coping skills.      STATUS AFTER INTERVENTION: unchanged      RESPONSE TO LEARNING: able to verbalize current knowledge/ experience, able to verbalize/acknowledge new learning, able to retain information, able to explain artwork     ENDINGS: none reported     MODES OF INTERVENTION: exploration, art media, socialization, psychoeducation      DISCIPLINE RESPONSIBLE: Art Therapist      Jorge A Nava  Art Therapist, MA, ATR-P  Provisional Registered Art Therapist

## 2024-02-07 NOTE — PLAN OF CARE
Problem: ABCDS Injury Assessment  Goal: Absence of physical injury  Outcome: Progressing     Problem: Pain  Goal: Verbalizes/displays adequate comfort level or baseline comfort level  Outcome: Progressing     Problem: Anxiety  Goal: Will report anxiety at manageable levels  Description: INTERVENTIONS:  1. Administer medication as ordered  2. Teach and rehearse alternative coping skills  3. Provide emotional support with 1:1 interaction with staff  Outcome: Progressing     Problem: Coping  Goal: Pt/Family able to verbalize concerns and demonstrate effective coping strategies  Description: INTERVENTIONS:  1. Assist patient/family to identify coping skills, available support systems and cultural and spiritual values  2. Provide emotional support, including active listening and acknowledgement of concerns of patient and caregivers  3. Reduce environmental stimuli, as able  4. Instruct patient/family in relaxation techniques, as appropriate  5. Assess for spiritual pain/suffering and initiate Spiritual Care, Psychosocial Clinical Specialist consults as needed  2/7/2024 0034 by Sandra Zacarias, RN  Outcome: Progressing  2/6/2024 1156 by Gina Kaur RN  Outcome: Progressing     Problem: Decision Making  Goal: Pt/Family able to effectively weigh alternatives and participate in decision making related to treatment and care  Description: INTERVENTIONS:  1. Determine when there are differences between patient's view, family's view, and healthcare provider's view of condition  2. Facilitate patient and family articulation of goals for care  3. Help patient and family identify pros/cons of alternative solutions  4. Provide information as requested by patient/family  5. Respect patient/family right to receive or not to receive information  6. Serve as a liaison between patient and family and health care team  7. Initiate Consults from Ethics, Palliative Care or initiate Family Care Conference as is appropriate  Outcome:

## 2024-02-07 NOTE — BH NOTE
Pt appeared to have slept for 5.25 hours thus far (very pleasant upon approach). Will continue to monitor for safety and changes in behavior.

## 2024-02-08 LAB
GLUCOSE BLD STRIP.AUTO-MCNC: 107 MG/DL (ref 70–110)
GLUCOSE BLD STRIP.AUTO-MCNC: 112 MG/DL (ref 70–110)
GLUCOSE BLD STRIP.AUTO-MCNC: 118 MG/DL (ref 70–110)
GLUCOSE BLD STRIP.AUTO-MCNC: 131 MG/DL (ref 70–110)

## 2024-02-08 PROCEDURE — 6370000000 HC RX 637 (ALT 250 FOR IP): Performed by: EMERGENCY MEDICINE

## 2024-02-08 PROCEDURE — 6370000000 HC RX 637 (ALT 250 FOR IP)

## 2024-02-08 PROCEDURE — 6370000000 HC RX 637 (ALT 250 FOR IP): Performed by: STUDENT IN AN ORGANIZED HEALTH CARE EDUCATION/TRAINING PROGRAM

## 2024-02-08 PROCEDURE — 6370000000 HC RX 637 (ALT 250 FOR IP): Performed by: PSYCHIATRY & NEUROLOGY

## 2024-02-08 PROCEDURE — 82962 GLUCOSE BLOOD TEST: CPT

## 2024-02-08 PROCEDURE — 99231 SBSQ HOSP IP/OBS SF/LOW 25: CPT | Performed by: PSYCHIATRY & NEUROLOGY

## 2024-02-08 PROCEDURE — 1240000000 HC EMOTIONAL WELLNESS R&B

## 2024-02-08 RX ADMIN — HYDROCORTISONE ACETATE: 1 CREAM TOPICAL at 08:41

## 2024-02-08 RX ADMIN — GABAPENTIN 600 MG: 300 CAPSULE ORAL at 20:18

## 2024-02-08 RX ADMIN — Medication: at 20:21

## 2024-02-08 RX ADMIN — LISINOPRIL 20 MG: 20 TABLET ORAL at 08:37

## 2024-02-08 RX ADMIN — CITALOPRAM HYDROBROMIDE 30 MG: 20 TABLET ORAL at 08:35

## 2024-02-08 RX ADMIN — ATORVASTATIN CALCIUM 10 MG: 10 TABLET, FILM COATED ORAL at 20:18

## 2024-02-08 RX ADMIN — TRAMADOL HYDROCHLORIDE 50 MG: 50 TABLET ORAL at 11:18

## 2024-02-08 RX ADMIN — DIPHENHYDRAMINE HYDROCHLORIDE 50 MG: 25 CAPSULE ORAL at 20:18

## 2024-02-08 RX ADMIN — FERROUS SULFATE TAB 325 MG (65 MG ELEMENTAL FE) 325 MG: 325 (65 FE) TAB at 08:35

## 2024-02-08 RX ADMIN — ACETAMINOPHEN 325MG 650 MG: 325 TABLET ORAL at 11:18

## 2024-02-08 RX ADMIN — AMLODIPINE BESYLATE 10 MG: 10 TABLET ORAL at 08:37

## 2024-02-08 RX ADMIN — LORAZEPAM 0.5 MG: 0.5 TABLET ORAL at 14:12

## 2024-02-08 RX ADMIN — GABAPENTIN 600 MG: 300 CAPSULE ORAL at 08:35

## 2024-02-08 RX ADMIN — MELOXICAM 7.5 MG: 7.5 TABLET ORAL at 08:35

## 2024-02-08 RX ADMIN — Medication: at 11:21

## 2024-02-08 RX ADMIN — HYDROCORTISONE ACETATE: 1 CREAM TOPICAL at 21:44

## 2024-02-08 RX ADMIN — FOLIC ACID 1 MG: 1 TABLET ORAL at 08:35

## 2024-02-08 RX ADMIN — Medication 500 MG: at 08:35

## 2024-02-08 RX ADMIN — LORAZEPAM 0.5 MG: 0.5 TABLET ORAL at 08:34

## 2024-02-08 RX ADMIN — TRAMADOL HYDROCHLORIDE 50 MG: 50 TABLET ORAL at 20:21

## 2024-02-08 RX ADMIN — BUSPIRONE HYDROCHLORIDE 5 MG: 5 TABLET ORAL at 08:35

## 2024-02-08 RX ADMIN — BUSPIRONE HYDROCHLORIDE 5 MG: 5 TABLET ORAL at 20:18

## 2024-02-08 RX ADMIN — LORAZEPAM 0.5 MG: 0.5 TABLET ORAL at 20:18

## 2024-02-08 RX ADMIN — GABAPENTIN 600 MG: 300 CAPSULE ORAL at 14:12

## 2024-02-08 RX ADMIN — ACETAMINOPHEN 325MG 650 MG: 325 TABLET ORAL at 20:21

## 2024-02-08 ASSESSMENT — PAIN SCALES - GENERAL
PAINLEVEL_OUTOF10: 5
PAINLEVEL_OUTOF10: 2
PAINLEVEL_OUTOF10: 5

## 2024-02-08 ASSESSMENT — PAIN DESCRIPTION - ORIENTATION: ORIENTATION: LOWER

## 2024-02-08 ASSESSMENT — PAIN DESCRIPTION - DESCRIPTORS
DESCRIPTORS: ACHING
DESCRIPTORS: ACHING

## 2024-02-08 ASSESSMENT — PAIN DESCRIPTION - LOCATION
LOCATION: KNEE
LOCATION: BACK

## 2024-02-08 NOTE — BH NOTE
ELVIN Note; The above pt was given her belongings that were brought for the above pt. All items were inventoried and her items were given to the above pt. Her belongings sheet was updated and signed at this The above pt plan of care is on-going no further complaints or concerns.  time.

## 2024-02-08 NOTE — PLAN OF CARE
Problem: Anxiety  Goal: Will report anxiety at manageable levels  Description: INTERVENTIONS:  1. Administer medication as ordered  2. Teach and rehearse alternative coping skills  3. Provide emotional support with 1:1 interaction with staff  2/7/2024 1211 by Linda Danielle RN  Outcome: Progressing     Problem: Behavior  Goal: Pt/Family maintain appropriate behavior and adhere to behavioral management agreement, if implemented  Description: INTERVENTIONS:  1. Assess patient/family's coping skills and  non-compliant behavior (including use of illegal substances)  2. Notify security of behavior or suspected illegal substances which indicate the need for search of the family and/or belongings  3. Encourage verbalization of thoughts and concerns in a socially appropriate manner  4. Utilize positive, consistent limit setting strategies supporting safety of patient, staff and others  5. Encourage participation in the decision making process about the behavioral management agreement  6. If a visitor's behavior poses a threat to safety call refer to organization policy.  7. Initiate consult with , Psychosocial CNS, Spiritual Care as appropriate  Outcome: Progressing     Problem: Depression/Self Harm  Goal: Effect of psychiatric condition will be minimized and patient will be protected from self harm  Description: INTERVENTIONS:  1. Assess impact of patient's symptoms on level of functioning, self care needs and offer support as indicated  2. Assess patient/family knowledge of depression, impact on illness and need for teaching  3. Provide emotional support, presence and reassurance  4. Assess for possible suicidal thoughts or ideation. If patient expresses suicidal thoughts or statements do not leave alone, initiate Suicide Precautions, move to a room close to the nursing station and obtain sitter  5. Initiate consults as appropriate with Mental Health Professional, Spiritual Care, Psychosocial CNS, and  consider a recommendation to the LIP for a Psychiatric Consultation  2/7/2024 2331 by Quentin Capps, RN  Outcome: Progressing  2/7/2024 1211 by Linda Danielle, RN  Outcome: Progressing     Problem: Self Harm/Suicidality  Goal: Will have no self-injury during hospital stay  Description: INTERVENTIONS:  1.  Ensure constant observer at bedside with Q15M safety checks  2.  Maintain a safe environment  3.  Secure patient belongings  4.  Ensure family/visitors adhere to safety recommendations  5.  Ensure safety tray has been added to patient's diet order  6.  Every shift and PRN: Re-assess suicidal risk via Frequent Screener    Outcome: Progressing       Pt presents w/ euthymic mood and congruent affect.  Reports improved mood and looks forward to upcoming d/c to [facility], hopefully this week.  Denies si/hi and avh.  Has been pleasant and cooperative.  An active participant in her tx plan.  Was complaint w/ hs scheduled medications and ate hs snack.  Will continue to monitor/support

## 2024-02-08 NOTE — BSMART NOTE
Patient is being discharged today, 2-9-24, from [facility]. Patient is leaving for the facility. [facility].     Address: [address], [place] [id number] Phone # [phone], Fax # [phone].     Transportation will be schedule early tomorrow morning. Patient is to be at the facility by 10:00 AM.

## 2024-02-08 NOTE — GROUP NOTE
Group Therapy Note    Date: 2/8/2024    Group Start Time: 0930  Group End Time: 1030  Group Topic: Music Therapy      Vernell Calles        Group Therapy Note    Attendees: 7/15    Group Focus: Music therapy group consisted of group members identifying and sharing personally meaningful songs in various categories related to their lives and in coping. Categories included identifying songs that remind them of someone important or an important event, songs that inspire them, songs that help them feel understood, songs to help calm down, and songs or lyrics to help accept oneself or their situation. Group members shared songs from their lists, and space was provided for verbal processing. The group may promote use of music as a coping skill.         Notes:  Patient did not attend group due to an issue with her pants which she stated is being worked on. Therapist provided patient with worksheet for the group, and patient was receptive. Patient asked to have her door open so she could hear the music from her room.       Discipline Responsible: /Counselor      Signature:  TAMY Serrano, LPC  Board-Certified Music Therapist  Licensed Professional Counselor

## 2024-02-08 NOTE — GROUP NOTE
Group Therapy Note    Date: 2/8/2024    Group Start Time: 1500  Group End Time: 1600  Group Topic: Recreational    [facility]    Sheila Silvestre        Group Therapy Note    Attendees: 9/14    Group Type: Act It or Describe It       Group Focus: Recreational therapy group involved group members identifying emotions and coping skills through acting or descriptions. Patients discussed how to positively react and cope with stressful life events. This group may enhance coping strategies, emotional awareness, mood, and decrease stress and anxiety.               Notes:  Patient did not attend group, patient stated she did not feel well.    Recreational Therapist  Sheila Silvestre

## 2024-02-08 NOTE — BSMART NOTE
SOCIAL WORK GROUP THERAPY  NOTE           Date: February 8, 2024.     Group Time:  12:00 PM      Group Ended 12:45      [facility]     Group Topic:  Peace and Tranquility.    Group members Participation: 7      Patient Refused to Participate in Group session today but was encouraged to Contribute.    Group session:  discussed with patient how important it is to have Peace and Tranquility in their live. SW. went over both definitions and how they work differently. Peace is represent the state of serenity, and calmness. It is a basic  human right that is essential to have in ones' life. Tranquility represent quietness and peacefulness. It is important  to maintain mental and emotional health.      Crissy Santizo, , MSW. Supervisee

## 2024-02-09 VITALS
BODY MASS INDEX: 51.91 KG/M2 | OXYGEN SATURATION: 100 % | WEIGHT: 293 LBS | HEIGHT: 63 IN | DIASTOLIC BLOOD PRESSURE: 86 MMHG | RESPIRATION RATE: 16 BRPM | HEART RATE: 99 BPM | SYSTOLIC BLOOD PRESSURE: 149 MMHG | TEMPERATURE: 97.9 F

## 2024-02-09 PROBLEM — F32.A DEPRESSION: Status: RESOLVED | Noted: 2024-02-03 | Resolved: 2024-02-09

## 2024-02-09 LAB — GLUCOSE BLD STRIP.AUTO-MCNC: 110 MG/DL (ref 70–110)

## 2024-02-09 PROCEDURE — 82962 GLUCOSE BLOOD TEST: CPT

## 2024-02-09 PROCEDURE — 6370000000 HC RX 637 (ALT 250 FOR IP)

## 2024-02-09 PROCEDURE — 6370000000 HC RX 637 (ALT 250 FOR IP): Performed by: PSYCHIATRY & NEUROLOGY

## 2024-02-09 PROCEDURE — 6370000000 HC RX 637 (ALT 250 FOR IP): Performed by: EMERGENCY MEDICINE

## 2024-02-09 PROCEDURE — 6370000000 HC RX 637 (ALT 250 FOR IP): Performed by: STUDENT IN AN ORGANIZED HEALTH CARE EDUCATION/TRAINING PROGRAM

## 2024-02-09 RX ORDER — GABAPENTIN 300 MG/1
600 CAPSULE ORAL 3 TIMES DAILY
Qty: 90 CAPSULE | Refills: 0 | Status: SHIPPED | OUTPATIENT
Start: 2024-02-09 | End: 2024-03-10

## 2024-02-09 RX ORDER — BENZOCAINE/MENTHOL 6 MG-10 MG
LOZENGE MUCOUS MEMBRANE
Qty: 30 G | Refills: 0 | Status: SHIPPED | OUTPATIENT
Start: 2024-02-09 | End: 2024-02-16

## 2024-02-09 RX ORDER — DIPHENHYDRAMINE HCL 50 MG
50 CAPSULE ORAL NIGHTLY PRN
Qty: 30 CAPSULE | Refills: 0 | Status: SHIPPED | OUTPATIENT
Start: 2024-02-09 | End: 2024-03-10

## 2024-02-09 RX ORDER — MELOXICAM 7.5 MG/1
7.5 TABLET ORAL DAILY
Qty: 30 TABLET | Refills: 0 | Status: SHIPPED | OUTPATIENT
Start: 2024-02-09

## 2024-02-09 RX ORDER — ASCORBIC ACID 500 MG
500 TABLET ORAL DAILY
Qty: 30 TABLET | Refills: 0 | Status: SHIPPED | OUTPATIENT
Start: 2024-02-10

## 2024-02-09 RX ORDER — FOLIC ACID 1 MG/1
1 TABLET ORAL DAILY
Qty: 30 TABLET | Refills: 0 | Status: SHIPPED | OUTPATIENT
Start: 2024-02-09

## 2024-02-09 RX ORDER — CITALOPRAM HYDROBROMIDE 10 MG/1
30 TABLET ORAL DAILY
Qty: 90 TABLET | Refills: 0 | Status: SHIPPED | OUTPATIENT
Start: 2024-02-10

## 2024-02-09 RX ORDER — LISINOPRIL 20 MG/1
20 TABLET ORAL DAILY
Qty: 30 TABLET | Refills: 0 | Status: SHIPPED | OUTPATIENT
Start: 2024-02-10

## 2024-02-09 RX ORDER — LANOLIN ALCOHOL/MO/W.PET/CERES
500 CREAM (GRAM) TOPICAL DAILY
Qty: 15 TABLET | Refills: 0 | Status: SHIPPED | OUTPATIENT
Start: 2024-02-09

## 2024-02-09 RX ORDER — M-VIT,TX,IRON,MINS/CALC/FOLIC 27MG-0.4MG
1 TABLET ORAL DAILY
Qty: 38 TABLET | Refills: 0 | Status: SHIPPED | OUTPATIENT
Start: 2024-02-09

## 2024-02-09 RX ORDER — LORAZEPAM 0.5 MG/1
0.5 TABLET ORAL 3 TIMES DAILY
Qty: 45 TABLET | Refills: 1 | Status: SHIPPED | OUTPATIENT
Start: 2024-02-09 | End: 2024-03-10

## 2024-02-09 RX ORDER — FERROUS SULFATE 325(65) MG
325 TABLET ORAL
Qty: 30 TABLET | Refills: 0 | Status: SHIPPED | OUTPATIENT
Start: 2024-02-10

## 2024-02-09 RX ORDER — BUSPIRONE HYDROCHLORIDE 10 MG/1
10 TABLET ORAL 2 TIMES DAILY
Status: DISCONTINUED | OUTPATIENT
Start: 2024-02-09 | End: 2024-02-09 | Stop reason: HOSPADM

## 2024-02-09 RX ORDER — BUSPIRONE HYDROCHLORIDE 10 MG/1
10 TABLET ORAL 2 TIMES DAILY
Qty: 60 TABLET | Refills: 0 | Status: SHIPPED | OUTPATIENT
Start: 2024-02-09

## 2024-02-09 RX ORDER — AMLODIPINE BESYLATE 10 MG/1
10 TABLET ORAL DAILY
Qty: 30 TABLET | Refills: 0 | Status: SHIPPED | OUTPATIENT
Start: 2024-02-09

## 2024-02-09 RX ORDER — LANOLIN ALCOHOL/MO/W.PET/CERES
1 CREAM (GRAM) TOPICAL 2 TIMES DAILY PRN
Qty: 113 G | Refills: 0 | Status: SHIPPED | OUTPATIENT
Start: 2024-02-09

## 2024-02-09 RX ORDER — ATORVASTATIN CALCIUM 10 MG/1
10 TABLET, FILM COATED ORAL NIGHTLY
Qty: 30 TABLET | Refills: 0 | Status: SHIPPED | OUTPATIENT
Start: 2024-02-09

## 2024-02-09 RX ADMIN — LORAZEPAM 0.5 MG: 0.5 TABLET ORAL at 08:25

## 2024-02-09 RX ADMIN — Medication: at 08:45

## 2024-02-09 RX ADMIN — GABAPENTIN 600 MG: 300 CAPSULE ORAL at 08:25

## 2024-02-09 RX ADMIN — Medication 1 APPLICATION: at 08:29

## 2024-02-09 RX ADMIN — TRAMADOL HYDROCHLORIDE 50 MG: 50 TABLET ORAL at 08:24

## 2024-02-09 RX ADMIN — Medication 1 APPLICATION: at 08:45

## 2024-02-09 RX ADMIN — FERROUS SULFATE TAB 325 MG (65 MG ELEMENTAL FE) 325 MG: 325 (65 FE) TAB at 08:24

## 2024-02-09 RX ADMIN — CITALOPRAM HYDROBROMIDE 30 MG: 20 TABLET ORAL at 08:24

## 2024-02-09 RX ADMIN — Medication 500 MG: at 08:24

## 2024-02-09 RX ADMIN — ACETAMINOPHEN 325MG 650 MG: 325 TABLET ORAL at 08:24

## 2024-02-09 RX ADMIN — BUSPIRONE HYDROCHLORIDE 10 MG: 10 TABLET ORAL at 08:28

## 2024-02-09 RX ADMIN — AMLODIPINE BESYLATE 10 MG: 10 TABLET ORAL at 08:24

## 2024-02-09 RX ADMIN — Medication: at 08:29

## 2024-02-09 RX ADMIN — FOLIC ACID 1 MG: 1 TABLET ORAL at 08:25

## 2024-02-09 RX ADMIN — LISINOPRIL 20 MG: 20 TABLET ORAL at 08:24

## 2024-02-09 RX ADMIN — MELOXICAM 7.5 MG: 7.5 TABLET ORAL at 08:24

## 2024-02-09 ASSESSMENT — PAIN SCALES - GENERAL
PAINLEVEL_OUTOF10: 2
PAINLEVEL_OUTOF10: 6

## 2024-02-09 ASSESSMENT — PAIN DESCRIPTION - DESCRIPTORS: DESCRIPTORS: ACHING

## 2024-02-09 ASSESSMENT — PAIN DESCRIPTION - LOCATION: LOCATION: KNEE;BACK

## 2024-02-09 NOTE — PLAN OF CARE
Problem: Anxiety  Goal: Will report anxiety at manageable levels  Description: INTERVENTIONS:  1. Administer medication as ordered  2. Teach and rehearse alternative coping skills  3. Provide emotional support with 1:1 interaction with staff  2/8/2024 2223 by Quentin Capps, RN  Outcome: Progressing  2/8/2024 1058 by Linda Danielle, RN  Outcome: Progressing     Problem: Coping  Goal: Pt/Family able to verbalize concerns and demonstrate effective coping strategies  Description: INTERVENTIONS:  1. Assist patient/family to identify coping skills, available support systems and cultural and spiritual values  2. Provide emotional support, including active listening and acknowledgement of concerns of patient and caregivers  3. Reduce environmental stimuli, as able  4. Instruct patient/family in relaxation techniques, as appropriate  5. Assess for spiritual pain/suffering and initiate Spiritual Care, Psychosocial Clinical Specialist consults as needed  Outcome: Progressing     Problem: Decision Making  Goal: Pt/Family able to effectively weigh alternatives and participate in decision making related to treatment and care  Description: INTERVENTIONS:  1. Determine when there are differences between patient's view, family's view, and healthcare provider's view of condition  2. Facilitate patient and family articulation of goals for care  3. Help patient and family identify pros/cons of alternative solutions  4. Provide information as requested by patient/family  5. Respect patient/family right to receive or not to receive information  6. Serve as a liaison between patient and family and health care team  7. Initiate Consults from Ethics, Palliative Care or initiate Family Care Conference as is appropriate  Outcome: Progressing     Problem: Behavior  Goal: Pt/Family maintain appropriate behavior and adhere to behavioral management agreement, if implemented  Description: INTERVENTIONS:  1. Assess patient/family's coping skills

## 2024-02-09 NOTE — PROGRESS NOTES
completed the initial Spiritual Assessment of the patient, and offered Pastoral Care, support to the patient in bed HC of the emergency room where she will be admitted to the [facility] unit of the hospital.  Patient was having breakfast. There is no advance directive on file.   . Patient does not have any Moravian/cultural needs that will affect patient’s preferences in health care. Chaplains will continue to follow and will provide pastoral care on an as needed/requested basis.    Chaplain Patrick Fields  Board Certified   Spiritual Care Department  [phone]  
 conducted an Spirituality Group for Nanci Patrick, who is a 43 y.o.,female.   Patient’s Primary Language is: English.   According to the patient’s EMR Yarsanism Affiliation is: Presybeterian.     The reason the Patient came to the hospital is:   Patient Active Problem List    Diagnosis Date Noted    Depression 02/03/2024    Abdominal wall cellulitis 08/24/2023    Primary hypertension 12/02/2021    Class 3 severe obesity in adult (HCC) 11/28/2021    Major depressive disorder, recurrent episode, severe with anxious distress ([facility]) 11/28/2021    Left knee pain     Back pain          conducted Spirituality Group for Humor/Exercise who was one of ten participants.    The  provided the following Interventions:  Continued the relationship of care and support.   Listened empathically.  Offered prayer and assurance of continued prayer on patients behalf.   Chart reviewed.    The following outcomes were achieved:  Patient expressed gratitude for 's visit.    Assessment:  There are no further spiritual or Hoahaoism issues which require Spiritual Care Services interventions at this time.     Plan:  Chaplains will continue to follow and will provide pastoral care on an as needed/requested basis.   recommends bedside caregivers page  on duty if patient shows signs of acute spiritual or emotional distress.       amy Resendez  Spiritual Health   [phone]   
A courtesy transportation request has been made to care management for discharge transportation to [facility].  
[facility]  Inpatient Progress Note     Date of Service: 02/05/24  Hospital Day: 2     Subjective/Interval History   02/05/24    Treatment Team Notes:  Notes reviewed and/or discussed and report that Nanci Patrick is a patient with a history of depression, recurrent, multifactorial, recently readmitted to our facility.  Attention is invited to the dictated admission note which is self-explanatory.      Patient interview: Nanci Patrick was interviewed by this writer today.  During session today, the patient remains rather despondent, describing the presence of suicidal thoughts however able to CFS while she is in the facility.  Her prior history was reviewed with the undersigned providing care for the patient during her prior admission here in our facility 2 years or so ago.  Since then she has continued with further outpatient treatment Apparently Being Provided peace of mind I will outpatient private practice in [place].  Medically she is providing care at [facility] require appears to be a chronic history of anemia, obesity, hypertension, peripheral neuropathy, and the history of prediabetes.  The patient has also had a prior history of elevated liver profile with a diagnosis of steatosis by abdominal ultrasound being made.  Being treated with a combination of medications which included prescriptions for duloxetine, Seroquel and Ativan, treatment with the atypical treatment with BuSpar has been a started.  Atypical antipsychotic has been discontinued since the patient was admitted to the facility.  Treatment with buspirone was also started upon admission to the facility.      Objective     Vitals:    02/05/24 0759   BP: (!) 155/80   Pulse: 90   Resp: 18   Temp: 98.3 °F (36.8 °C)   SpO2: 96%     Blood pressure is elevated.  For now we will continue the same treatment with lisinopril and amlodipine.    Recent Results (from the past 24 hour(s))   Hemoglobin A1C    
[facility]  Inpatient Progress Note     Date of Service: 02/08/24  Hospital Day: 5     Subjective/Interval History   02/08/24    Treatment Team Notes:  Notes reviewed and/or discussed and report that Nanci Patrick is a patient with a history of depression showing positive signs of improvement and awaiting for transfer to the [facility].      Patient interview: Nanci Patrick was interviewed by this writer today.  Although today is not her best day as she described it, there is in general very positive improvement of the patient's depression.  She was again made aware that the [facility] has accepted her we are hopeful that a bed will be available for her tomorrow.  If that is the case the patient will be discharged then.  She describes being in complete agreement to her doing so.      Objective     Vitals:    02/08/24 1118   BP:    Pulse:    Resp: 18   Temp:    SpO2:      Vitals earlier today included blood pressure 148/80, heart rate 88, temperature 36.2, respirations 18 as above as stated.    Recent Results (from the past 24 hour(s))   POCT Glucose    Collection Time: 02/07/24  3:53 PM   Result Value Ref Range    POC Glucose 143 (H) 70 - 110 mg/dL   POCT Glucose    Collection Time: 02/07/24  7:44 PM   Result Value Ref Range    POC Glucose 120 (H) 70 - 110 mg/dL   POCT Glucose    Collection Time: 02/08/24  6:52 AM   Result Value Ref Range    POC Glucose 112 (H) 70 - 110 mg/dL   POCT Glucose    Collection Time: 02/08/24 11:32 AM   Result Value Ref Range    POC Glucose 131 (H) 70 - 110 mg/dL     Above results noticed    Mental Status Examination     Appearance/Hygiene 43 y.o. Black /  female  Hygiene: Fair   Behavior/Social Relatedness Appropriate   Musculoskeletal Gait/Station: appropriate  Tone (flaccid, cogwheeling, spastic): not assessed  Psychomotor (hyperkinetic, hypokinetic): calm   Involuntary movements (tics, 
[facility]  Inpatient Progress Note     Date of Service: 02/06/24  Hospital Day: 3     Subjective/Interval History   02/06/24    Treatment Team Notes:  Notes reviewed and/or discussed and report that Nanci Patrick is a patient recently admitted to the facility recently, with attention being invited to the dictated admission note which is self-explanatory.  Attention was also invited to the patient's prior inpatient psychiatric records specifically her admission to this facility a couple of years ago under my care since a detail summary of the patient's history is stated to be found.    Patient interview: Nanci Patrick was interviewed by this writer today.  The patient described feeling less depressed today, having less suicidal thoughts, and continuing to be able to CFS while she is in the hospital.  For details on how stressful he was staying in a motel where her mother, her sister, her son and the patient herself is staying altogether in 1 room.  Steps are being taken to be able to refer the patient to a skill building program with assigned inpatient  describing during the treatment team session today that the benevolent treatment program is considering the possibility of accepting the patient under their care.      Objective     Vitals:    02/06/24 0834   BP: (!) 149/80   Pulse:    Resp:    Temp:    SpO2:      Blood pressure is a stable.  Other vitals included a heart rate 93, temperature 97.4.  Oxygen saturation rate is 100%, respirations are 20.    Recent Results (from the past 24 hour(s))   POCT Glucose    Collection Time: 02/05/24  4:26 PM   Result Value Ref Range    POC Glucose 92 70 - 110 mg/dL   POCT Glucose    Collection Time: 02/05/24  8:44 PM   Result Value Ref Range    POC Glucose 109 70 - 110 mg/dL   POCT Glucose    Collection Time: 02/06/24  7:36 AM   Result Value Ref Range    POC Glucose 114 (H) 70 - 110 mg/dL   POCT Glucose    Collection Time: 02/06/24 
[facility]  Inpatient Progress Note     Date of Service: 02/07/24  Hospital Day: 4     Subjective/Interval History   02/07/24    Treatment Team Notes:  Notes reviewed and/or discussed and report that Nanci Patrick is a patient with a history of depression without psychotic symptoms, was present during the treatment session this morning.  The patient participated appropriately.      Patient interview: Nanic Patrick was interviewed by this writer today.  The patient describes doing better.  Specifically, the intensity of her depression has improved the [place] self-control.  Per description of the assigned inpatient , the patient has been accepted by the [facility], for possible discharge this week.  The day has not been yet set.  Nonetheless she is doing better, participating appropriately, taking her medications appropriately, and denying medications related side effects.      Objective     Vitals:    02/07/24 1103   BP:    Pulse:    Resp: 18   Temp:    SpO2:      Vital signs show blood pressure 129/79 heart rate 81 temperature 97.4 respirations 18 and 100% saturation rate.    Recent Results (from the past 24 hour(s))   POCT Glucose    Collection Time: 02/06/24  4:11 PM   Result Value Ref Range    POC Glucose 109 70 - 110 mg/dL   POCT Glucose    Collection Time: 02/06/24  8:01 PM   Result Value Ref Range    POC Glucose 120 (H) 70 - 110 mg/dL   POCT Glucose    Collection Time: 02/07/24  7:44 AM   Result Value Ref Range    POC Glucose 128 (H) 70 - 110 mg/dL     Above results noticed.    Mental Status Examination     Appearance/Hygiene 43 y.o. Black /  female  Hygiene: Fair   Behavior/Social Relatedness Appropriate   Musculoskeletal Gait/Station: appropriate  Tone (flaccid, cogwheeling, spastic): not assessed  Psychomotor (hyperkinetic, hypokinetic): calm   Involuntary movements (tics, dyskinesias, akathisa, stereotypies): none   Speech   
Patient is scheduled for a telephone assessment with [facility] today at 230 pm with Vi. Patient's  has been notified and will assist the patient with calling [phone] to complete the interview. .  
Patient requested PRN pain medication R/T lower back pain. Pain level 5. PRN tylenol 650 mg and Tramadol 50 mg given PO.   
Patient requesting both tramadol and tylenol pain medication for lower back pain. PRN tramadol 50 mg given PO and Tylenol 650 mg given PO.   
Patient seen by [facility] for H&P consult.  
Patient's clinical information was faxed to [facility] in [place], [place] for admission review for step down community stabilization.   [place] [address]  [place], [place] [address] [phone]  
Pt arrived to unit in w/c with belongings. Pt composed and cooperative with polite affect and friendly/composed disposition on admission. Pt denies current SI/HI/AH/CH/VH/thoughts of self-harm in conversation with this RN. Pt provided and ate lunch meal tray. Pt states has been living in hotels for the past two years. Pt states until Pt reached 39, Pt able to work and pay rent/bills despite some depression. Pt states once Pt turned 39, Pt feels that anxiety has become overwhelming to the point that Pt began having significant memory problems that affected home and work. Pt states aunt, sisters, and mother developed same severe anxiety/social phobia once reaching 39 years old. Pt states of anxiety, \"I don't know if it's genetic or situational. I think it's combined.\" Pt states anxiety became debilitating and Pt no longer able to work. Pt states had to tell older sister who Pt was allowing to live with patient due to debilitating anxiety and depression that they would be evicted as Pt developing severe anxiety as well. Pt states since then Pt and Pt's sister have been living in a series of motels. Pt states sister begrudgingly works and pays for motels. Pt states is actively applying for disability. Pt states Pt's mother and Pt's adult son have recently moved into the same one bedroom apartment as they have also been evicted. Pt states they all live in the on bedroom motel with a cat. Pt states, \"My mood can change so quick. One minute I will be fine like this and talking to my mom and the next minute I won't even have the energy to talk anymore. I think it irritates my mother because she likes talking, but I will suddenly be thinking, 'What's the point of talking? Why am I even here? What's the point of anything?' My mood changes so quickly. Sometimes I just want it to end. I know it will pass, but sometimes it's just too much. I spend a lot of time thinking about how to change my situation.\" Pt states, \"I keep 
Pt composed and socially withdrawn with polite affect when engaged and blunted affect generally. Pt resting quietly in bed except for medications, meals, and hygiene. Pt denies HI/AH/CH/VH/thoughts of self-harm in conversation with this RN. Pt endorsing passive SI without plan/intent. Pt states, \"I think it would be so much easier if I weren't here.\" Pt states depression 7/10 without specific trigger. Pt states of depression, \"I don't know what makes it worse than other times. I've never known. Pt rates anxiety 6/10. Pt states of anxiety, \"It's easing up. This morning it was a 8 or 9. That's just what happens the last couple of years.\" Pt states ativan has helped decrease anxiety today. Pt states reading has also helped decrease anxiety today stating, \"I was a huge reader, but I haven't been able to do it because of my antidepressants. I don't know what it is but something about them makes it really hard to focus. I used to love to read, but I had to make a trade off. My mental health or reading, so I made a choice.\" Pt verbalizing intent to inform staff of needs/changes in mood.   
Pt has been sleeping during pain medication reassessments. Pt requested to be allowed to sleep rather than disturbed. Pt currently resting quietly.   
Pt received discharge instructions, prescriptions, and emergency numbers. Pt completed suicide safety plan with staff. All personal belongings returned to pt. Pt encouraged to follow-up with outpatient resources and to call with any questions or concerns.     Patient is being discharged today, 2-9-24, from [facility]. Patient is leaving for the facility. [facility]. Address: [facility] [place] [address] Phone # [phone], Fax # [phone]. Transportation will be schedule early tomorrow morning. Patient is to be at the facility by 10:00 AM.   
Interventions:   Food and/or Nutrient Delivery: Continue Current Diet  Nutrition Education/Counseling: No recommendation at this time  Coordination of Nutrition Care: Continue to monitor while inpatient       Goals:     Goals: Meet at least 75% of estimated needs, by next RD assessment       Nutrition Monitoring and Evaluation:      Food/Nutrient Intake Outcomes: Food and Nutrient Intake  Physical Signs/Symptoms Outcomes: Meal Time Behavior    Discharge Planning:    Continue current diet     Beverly Basurto RD  Contact: [phone]

## 2025-02-28 ENCOUNTER — TRANSCRIBE ORDERS (OUTPATIENT)
Facility: HOSPITAL | Age: 45
End: 2025-02-28

## 2025-02-28 DIAGNOSIS — Z12.31 OTHER SCREENING MAMMOGRAM: Primary | ICD-10-CM

## 2025-04-30 ENCOUNTER — TRANSCRIBE ORDERS (OUTPATIENT)
Facility: HOSPITAL | Age: 45
End: 2025-04-30

## 2025-04-30 DIAGNOSIS — R01.1 SYSTOLIC MURMUR: ICD-10-CM

## 2025-04-30 DIAGNOSIS — R01.1 HEART MURMUR: Primary | ICD-10-CM
